# Patient Record
Sex: MALE | Race: WHITE | NOT HISPANIC OR LATINO | Employment: OTHER | URBAN - METROPOLITAN AREA
[De-identification: names, ages, dates, MRNs, and addresses within clinical notes are randomized per-mention and may not be internally consistent; named-entity substitution may affect disease eponyms.]

---

## 2017-02-10 ENCOUNTER — OFFICE VISIT (OUTPATIENT)
Dept: MEDICAL GROUP | Facility: MEDICAL CENTER | Age: 57
End: 2017-02-10
Payer: COMMERCIAL

## 2017-02-10 VITALS
RESPIRATION RATE: 16 BRPM | WEIGHT: 152 LBS | DIASTOLIC BLOOD PRESSURE: 76 MMHG | BODY MASS INDEX: 21.76 KG/M2 | HEIGHT: 70 IN | HEART RATE: 88 BPM | SYSTOLIC BLOOD PRESSURE: 118 MMHG | OXYGEN SATURATION: 95 % | TEMPERATURE: 98.7 F

## 2017-02-10 DIAGNOSIS — Z00.00 WELLNESS EXAMINATION: ICD-10-CM

## 2017-02-10 DIAGNOSIS — E78.5 DYSLIPIDEMIA: ICD-10-CM

## 2017-02-10 DIAGNOSIS — Z80.0 FAMILY HISTORY OF COLON CANCER: ICD-10-CM

## 2017-02-10 DIAGNOSIS — R25.1 TREMOR: ICD-10-CM

## 2017-02-10 DIAGNOSIS — Z23 NEED FOR TDAP VACCINATION: ICD-10-CM

## 2017-02-10 DIAGNOSIS — R73.02 IGT (IMPAIRED GLUCOSE TOLERANCE): ICD-10-CM

## 2017-02-10 PROCEDURE — 99396 PREV VISIT EST AGE 40-64: CPT | Mod: 25 | Performed by: INTERNAL MEDICINE

## 2017-02-10 PROCEDURE — 90471 IMMUNIZATION ADMIN: CPT | Performed by: INTERNAL MEDICINE

## 2017-02-10 PROCEDURE — 90715 TDAP VACCINE 7 YRS/> IM: CPT | Performed by: INTERNAL MEDICINE

## 2017-02-10 ASSESSMENT — PATIENT HEALTH QUESTIONNAIRE - PHQ9: CLINICAL INTERPRETATION OF PHQ2 SCORE: 0

## 2017-02-10 NOTE — MR AVS SNAPSHOT
"        Pantera Parker   2/10/2017 11:20 AM   Office Visit   MRN: [id number]    Department:  [address] [place] [address]   Dept Phone:  [phone]    Description:  Male : 1960   Provider:  Brant Wheeler M.D.           Reason for Visit     Establish Care     Tremors Right hand for 6 months      Allergies as of 2/10/2017     Allergen Noted Reactions    Lipitor [Atorvastatin] 02/10/2017         You were diagnosed with     Wellness examination   [4756134]       Family history of colon cancer   [456271]       Dyslipidemia   [518874]       IGT (impaired glucose tolerance)   [886586]       Need for Tdap vaccination   [164711]       Tremor   [874002]         Vital Signs     Blood Pressure Pulse Temperature Respirations Height Weight    118/76 mmHg 88 37.1 °C (98.7 °F) 16 1.778 m (5' 10\") 68.947 kg (152 lb)    Body Mass Index Oxygen Saturation Smoking Status             21.81 kg/m2 95% Never Smoker          Basic Information     Date Of Birth Sex Race Ethnicity Preferred Language    1960 Male White Non- English      Problem List              ICD-10-CM Priority Class Noted - Resolved    Dyslipidemia E78.5   10/6/2015 - Present    Diverticulosis of large intestine K57.30   10/6/2015 - Present    IGT (impaired glucose tolerance) R73.02   2016 - Present    Vitamin D deficiency E55.9   2016 - Present    Family history of colon cancer Z80.0   2/10/2017 - Present      Health Maintenance        Date Due Completion Dates    IMM DTaP/Tdap/Td Vaccine (1 - Tdap) 1981 ---    IMM INFLUENZA (1) 2016 ---    COLONOSCOPY 2018 (Done)    Override on 2013: Done            Current Immunizations     Tdap Vaccine  Incomplete      Below and/or attached are the medications your provider expects you to take. Review all of your home medications and newly ordered medications with your provider and/or pharmacist. Follow medication instructions as directed by your provider and/or pharmacist. Please " keep your medication list with you and share with your provider. Update the information when medications are discontinued, doses are changed, or new medications (including over-the-counter products) are added; and carry medication information at all times in the event of emergency situations     Allergies:  LIPITOR - (reactions not documented)               Medications  Valid as of: February 10, 2017 - 12:14 PM    Generic Name Brand Name Tablet Size Instructions for use    .                 Medicines prescribed today were sent to:     [facility]Y #[id number] - [place], [place] - [address]    [address] [place] [address]    Phone: [phone] Fax: [phone]    Open 24 Hours?: No      Medication refill instructions:       If your prescription bottle indicates you have medication refills left, it is not necessary to call your provider’s office. Please contact your pharmacy and they will refill your medication.    If your prescription bottle indicates you do not have any refills left, you may request refills at any time through one of the following ways: The online [employer] system (except Urgent Care), by calling your provider’s office, or by asking your pharmacy to contact your provider’s office with a refill request. Medication refills are processed only during regular business hours and may not be available until the next business day. Your provider may request additional information or to have a follow-up visit with you prior to refilling your medication.   *Please Note: Medication refills are assigned a new Rx number when refilled electronically. Your pharmacy may indicate that no refills were authorized even though a new prescription for the same medication is available at the pharmacy. Please request the medicine by name with the pharmacy before contacting your provider for a refill.        Your To Do List     Future Labs/Procedures Complete By Expires    COMP METABOLIC PANEL  As directed 2/11/2018     HEMOGLOBIN A1C  As directed 2/11/2018    LIPID PROFILE  As directed 2/11/2018    THYROID CASCADE PROFILE  As directed 2/11/2018         MyChart Access Code: Activation code not generated  Current MyChart Status: Active

## 2017-02-10 NOTE — PROGRESS NOTES
"CC: Establishing care wellness examination.    HPI:   Pantera presents today with the following.    1. Wellness examination  Presents for wellness examination. He is current with all major screenings as and blood work. Some slight elevated blood sugars in the past but no true diabetes. He did have some cholesterol abnormalities but is been diligent about diet and exercise. He does have an occasional tremor but nothing progressive. He is coming due for tetanus shot and declines flu shot.      Patient Active Problem List    Diagnosis Date Noted   • Family history of colon cancer 02/10/2017   • Tremor 02/10/2017   • IGT (impaired glucose tolerance) 05/20/2016   • Vitamin D deficiency 05/20/2016   • Dyslipidemia 10/06/2015   • Diverticulosis of large intestine 10/06/2015       No current outpatient prescriptions on file.     No current facility-administered medications for this visit.         Allergies as of 02/10/2017 - Baldemar as Reviewed 02/10/2017   Allergen Reaction Noted   • Lipitor [atorvastatin]  02/10/2017        ROS: As per HPI.    /76 mmHg  Pulse 88  Temp(Src) 37.1 °C (98.7 °F)  Resp 16  Ht 1.778 m (5' 10\")  Wt 68.947 kg (152 lb)  BMI 21.81 kg/m2  SpO2 95%    Physical Exam:  Gen:         Alert and oriented, No apparent distress.  Neck:        No Lymphadenopathy or Bruits.  Lungs:     Clear to auscultation bilaterally  CV:          Regular rate and rhythm. No murmurs, rubs or gallops.               Ext:          No clubbing, cyanosis, edema.      Assessment and Plan.   56 y.o. male with the following issues.    1. Wellness examination  Discussed healthy lifestyle habits as well as screening regimens. We have ordered blood work and given tetanus shot.    - COMP METABOLIC PANEL; Future  - LIPID PROFILE; Future  - HEMOGLOBIN A1C; Future  - THYROID CASCADE PROFILE; Future  - Tdap =>8yo IM        "

## 2017-02-22 LAB
ALBUMIN SERPL-MCNC: 4.5 G/DL (ref 3.5–5.5)
ALBUMIN/GLOB SERPL: 1.8 {RATIO} (ref 1.1–2.5)
ALP SERPL-CCNC: 85 IU/L (ref 39–117)
ALT SERPL-CCNC: 19 IU/L (ref 0–44)
AST SERPL-CCNC: 12 IU/L (ref 0–40)
BILIRUB SERPL-MCNC: 0.5 MG/DL (ref 0–1.2)
BUN SERPL-MCNC: 20 MG/DL (ref 6–24)
BUN/CREAT SERPL: 19 (ref 9–20)
CALCIUM SERPL-MCNC: 9.5 MG/DL (ref 8.7–10.2)
CHLORIDE SERPL-SCNC: 102 MMOL/L (ref 96–106)
CHOLEST SERPL-MCNC: 201 MG/DL (ref 100–199)
CO2 SERPL-SCNC: 25 MMOL/L (ref 18–29)
COMMENT 011824: ABNORMAL
CREAT SERPL-MCNC: 1.04 MG/DL (ref 0.76–1.27)
GLOBULIN SER CALC-MCNC: 2.5 G/DL (ref 1.5–4.5)
GLUCOSE SERPL-MCNC: 103 MG/DL (ref 65–99)
HBA1C MFR BLD: 5.6 % (ref 4.8–5.6)
HDLC SERPL-MCNC: 49 MG/DL
LDLC SERPL CALC-MCNC: 122 MG/DL (ref 0–99)
POTASSIUM SERPL-SCNC: 4.2 MMOL/L (ref 3.5–5.2)
PROT SERPL-MCNC: 7 G/DL (ref 6–8.5)
SODIUM SERPL-SCNC: 142 MMOL/L (ref 134–144)
TRIGL SERPL-MCNC: 148 MG/DL (ref 0–149)
TSH SERPL DL<=0.005 MIU/L-ACNC: 1.07 UIU/ML (ref 0.45–4.5)
VLDLC SERPL CALC-MCNC: 30 MG/DL (ref 5–40)

## 2017-03-02 ENCOUNTER — TELEPHONE (OUTPATIENT)
Dept: MEDICAL GROUP | Facility: MEDICAL CENTER | Age: 57
End: 2017-03-02

## 2017-03-02 NOTE — TELEPHONE ENCOUNTER
1. Caller Name: Pantera Parker                                           Call Back Number: [phone] (home)         Patient approves a detailed voicemail message: N\A    Patient stated that he had labs done for metabolic panel , Lipid and A 1C on 2/21, patient stated that the codes would need to be changed to routine preventative in order for his insurance to cover the cost, patient had the labs done through [facility]? Thank you Please advise

## 2017-03-08 NOTE — TELEPHONE ENCOUNTER
Patient called back, patient stated that he spoke with his insurance and that the insurance stated that it was not the patients deductible, that a different code has to be submitted in order for the insurance to cover the cost of the patients labs? Thank you Please advise

## 2017-03-08 NOTE — TELEPHONE ENCOUNTER
VOICEMAIL  1. Caller Name: Loren                      Call Back Number: [phone]    2. Message: Left VM for the patient to call back to give message regarding labs.    3. Patient approves office to leave a detailed voicemail/MyChart message: N\A

## 2017-03-08 NOTE — TELEPHONE ENCOUNTER
I spoke to [facility] they are going to reprocess with the new code being first.

## 2017-03-08 NOTE — TELEPHONE ENCOUNTER
I called [facility] and spoke to Pascale in billing, she stated that the code that was submitted was the code that Dr. Wheeler gave to change the code which was r73.02, Pascale stated that the reason the labs were denied coverage was due to the patients deductible? Thank you Please advise

## 2017-03-13 ENCOUNTER — OFFICE VISIT (OUTPATIENT)
Dept: MEDICAL GROUP | Facility: MEDICAL CENTER | Age: 57
End: 2017-03-13
Payer: COMMERCIAL

## 2017-03-13 VITALS
OXYGEN SATURATION: 95 % | HEIGHT: 71 IN | WEIGHT: 150 LBS | BODY MASS INDEX: 21 KG/M2 | RESPIRATION RATE: 14 BRPM | DIASTOLIC BLOOD PRESSURE: 72 MMHG | SYSTOLIC BLOOD PRESSURE: 128 MMHG | HEART RATE: 87 BPM

## 2017-03-13 DIAGNOSIS — R25.1 TREMOR: ICD-10-CM

## 2017-03-13 PROCEDURE — 99214 OFFICE O/P EST MOD 30 MIN: CPT | Performed by: INTERNAL MEDICINE

## 2017-03-13 RX ORDER — PROPRANOLOL HYDROCHLORIDE 10 MG/1
10 TABLET ORAL
Qty: 30 TAB | Refills: 1 | Status: SHIPPED | OUTPATIENT
Start: 2017-03-13 | End: 2018-11-29

## 2017-03-13 NOTE — MR AVS SNAPSHOT
"        Pantera Parker   3/13/2017 7:40 AM   Office Visit   MRN: [id number]    Department:  [address] [place] [address]   Dept Phone:  [phone]    Description:  Male : 1960   Provider:  Brant Wheeler M.D.           Allergies as of 3/13/2017     Allergen Noted Reactions    Lipitor [Atorvastatin] 02/10/2017         You were diagnosed with     Tremor   [[id number]]         Vital Signs     Blood Pressure Pulse Respirations Height Weight Body Mass Index    128/72 mmHg 87 14 1.803 m (5' 11\") 68.04 kg (150 lb) 20.93 kg/m2    Oxygen Saturation Smoking Status                95% Never Smoker           Basic Information     Date Of Birth Sex Race Ethnicity Preferred Language    1960 Male White Non- English      Problem List              ICD-10-CM Priority Class Noted - Resolved    Dyslipidemia E78.5   10/6/2015 - Present    Diverticulosis of large intestine K57.30   10/6/2015 - Present    IGT (impaired glucose tolerance) R73.02   2016 - Present    Vitamin D deficiency E55.9   2016 - Present    Family history of colon cancer Z80.0   2/10/2017 - Present    Tremor R25.1   2/10/2017 - Present      Health Maintenance        Date Due Completion Dates    IMM INFLUENZA (1) 2016 ---    COLONOSCOPY 2018 (Done)    Override on 2013: Done    IMM DTaP/Tdap/Td Vaccine (2 - Td) 2/10/2027 2/10/2017            Current Immunizations     Tdap Vaccine 2/10/2017 12:17 PM      Below and/or attached are the medications your provider expects you to take. Review all of your home medications and newly ordered medications with your provider and/or pharmacist. Follow medication instructions as directed by your provider and/or pharmacist. Please keep your medication list with you and share with your provider. Update the information when medications are discontinued, doses are changed, or new medications (including over-the-counter products) are added; and carry medication information at all times in the event " of emergency situations     Allergies:  LIPITOR - (reactions not documented)               Medications  Valid as of: March 13, 2017 -  7:50 AM    Generic Name Brand Name Tablet Size Instructions for use    Propranolol HCl (Tab) INDERAL 10 MG Take 1 Tab by mouth 1 time daily as needed.        .                 Medicines prescribed today were sent to:     [facility]/PHARMACY #[id number] - [place], [place] - [address]    [address] [place] [address]    Phone: [phone] Fax: [phone]    Open 24 Hours?: No      Medication refill instructions:       If your prescription bottle indicates you have medication refills left, it is not necessary to call your provider’s office. Please contact your pharmacy and they will refill your medication.    If your prescription bottle indicates you do not have any refills left, you may request refills at any time through one of the following ways: The online [email or web address] system (except Urgent Care), by calling your provider’s office, or by asking your pharmacy to contact your provider’s office with a refill request. Medication refills are processed only during regular business hours and may not be available until the next business day. Your provider may request additional information or to have a follow-up visit with you prior to refilling your medication.   *Please Note: Medication refills are assigned a new Rx number when refilled electronically. Your pharmacy may indicate that no refills were authorized even though a new prescription for the same medication is available at the pharmacy. Please request the medicine by name with the pharmacy before contacting your provider for a refill.           [email or web address] Access Code: Activation code not generated  Current [email or web address] Status: Active

## 2017-03-13 NOTE — PROGRESS NOTES
"CC: Tremor    HPI:   Pantera presents today with the following.    1. Tremor  Presents complaining of tremor mainly affecting his right arm. He only notices it at rest and disappears immediately with intention. Alcohol may improve slightly. He does notice what sounds like more like fasciculations in his gluteus reginaldo occasionally. Does not seem to related to the arm tremor. No loss of expression or unstable gait. He denies any stiffness. He has no chest pain or shortness of breath no edema.      Patient Active Problem List    Diagnosis Date Noted   • Family history of colon cancer 02/10/2017   • Tremor 02/10/2017   • IGT (impaired glucose tolerance) 05/20/2016   • Vitamin D deficiency 05/20/2016   • Dyslipidemia 10/06/2015   • Diverticulosis of large intestine 10/06/2015       Current Outpatient Prescriptions   Medication Sig Dispense Refill   • propranolol (INDERAL) 10 MG Tab Take 1 Tab by mouth 1 time daily as needed. 30 Tab 1     No current facility-administered medications for this visit.         Allergies as of 03/13/2017 - Baldemar as Reviewed 02/10/2017   Allergen Reaction Noted   • Lipitor [atorvastatin]  02/10/2017        ROS: As per HPI.    /72 mmHg  Pulse 87  Resp 14  Ht 1.803 m (5' 11\")  Wt 68.04 kg (150 lb)  BMI 20.93 kg/m2  SpO2 95%    Physical Exam:  Gen:         Alert and oriented, No apparent distress.  Neck:        No Lymphadenopathy or Bruits.  Lungs:     Clear to auscultation bilaterally  CV:          Regular rate and rhythm. No murmurs, rubs or gallops.               Ext:          No clubbing, cyanosis, edema.  Neuro:  CN II-XII gorssly intact, Strenght 5/5 upper lower extremities, DTR's 2+ both               Upper and lower extremities.  Down going Babinski, Neg Romberg. Fine resting tremor non-pill-rolling of the right arm disappears with movement  Assessment and Plan.   56 y.o. male with the following issues.    1. Tremor  Suspect benign essential tremor have started on propranolol " short acting to see if he has any improvement with symptoms. Cautioned about side effects. They're not severe enough that he wanted taken medication daily. If medication is not helpful with the tremor will refer to neurology.  - propranolol (INDERAL) 10 MG Tab; Take 1 Tab by mouth 1 time daily as needed.  Dispense: 30 Tab; Refill: 1

## 2017-04-03 ENCOUNTER — TELEPHONE (OUTPATIENT)
Dept: MEDICAL GROUP | Facility: MEDICAL CENTER | Age: 57
End: 2017-04-03

## 2017-04-03 DIAGNOSIS — R25.1 TREMOR: ICD-10-CM

## 2017-04-03 NOTE — TELEPHONE ENCOUNTER
1. Caller Name: Pantera                      Call Back Number: [phone] (home)       2. Message: Patient needs his Neurologist referral to be resubmitted for [facility] [id number]   They can get him in sooner.    3. Patient approves office to leave a detailed voicemail/MyChart message: N\A

## 2017-06-23 ENCOUNTER — TELEPHONE (OUTPATIENT)
Dept: MEDICAL GROUP | Facility: MEDICAL CENTER | Age: 57
End: 2017-06-23

## 2017-06-23 NOTE — TELEPHONE ENCOUNTER
Please inform it was not coded incorrectly it was coded in a way that his insurance does not want to pay.  Which test was it that he had to pay for?

## 2017-06-23 NOTE — TELEPHONE ENCOUNTER
Spoke with [facility] and added the code. They resubmitted to the insurance. Spoke with patient and let him know.

## 2017-06-23 NOTE — TELEPHONE ENCOUNTER
1. Caller Name: Pantera Parker                                           Call Back Number: [phone] (home)         Patient approves a detailed voicemail message: N\A    Patient stated that he had labs drawn for metabolic panel, lipid panel and A1C on 2/21 through [facility] at [address] and cost for the labs are 23.50  due to incorrect coding patient stated that the labs were paid out of the patients HRA and needed to be paid out of [facility], patient said it came out of the wrong account, Pantera said that the code needs to be changed to the code for Preventative Care so that [facility] covers the cost of the labs, patient wanted someone to call him back regarding this? Please advise

## 2017-06-23 NOTE — TELEPHONE ENCOUNTER
[facility]ed [facility] and she tried adding the code to the blood test but it is not being accepted because it conflicts with the payers requirements. Her system does not say what the requirements are. Please advise.

## 2018-01-09 ENCOUNTER — OFFICE VISIT (OUTPATIENT)
Dept: MEDICAL GROUP | Facility: MEDICAL CENTER | Age: 58
End: 2018-01-09
Payer: COMMERCIAL

## 2018-01-09 VITALS
HEIGHT: 71 IN | BODY MASS INDEX: 21.59 KG/M2 | HEART RATE: 77 BPM | OXYGEN SATURATION: 97 % | DIASTOLIC BLOOD PRESSURE: 78 MMHG | WEIGHT: 154.2 LBS | SYSTOLIC BLOOD PRESSURE: 134 MMHG | RESPIRATION RATE: 16 BRPM | TEMPERATURE: 97.7 F

## 2018-01-09 DIAGNOSIS — E78.5 DYSLIPIDEMIA: ICD-10-CM

## 2018-01-09 DIAGNOSIS — Z11.59 NEED FOR HEPATITIS C SCREENING TEST: ICD-10-CM

## 2018-01-09 DIAGNOSIS — R73.02 IGT (IMPAIRED GLUCOSE TOLERANCE): ICD-10-CM

## 2018-01-09 DIAGNOSIS — Z00.00 WELLNESS EXAMINATION: ICD-10-CM

## 2018-01-09 DIAGNOSIS — Z12.11 SCREEN FOR COLON CANCER: ICD-10-CM

## 2018-01-09 PROCEDURE — 99396 PREV VISIT EST AGE 40-64: CPT | Performed by: INTERNAL MEDICINE

## 2018-01-09 RX ORDER — RASAGILINE 1 MG/1
1 TABLET ORAL DAILY
COMMUNITY
End: 2018-11-29

## 2018-01-09 NOTE — PROGRESS NOTES
"CC: Wellness examination    HPI:   Pantera presents today with the following.    1. Wellness examination  Presents due for wellness examination. He is coming due for colonoscopy. He refuses flu shots. He denies any chest pain or shortness of breath no edema is coming due for repeat blood work. He has no other complaints today.          Patient Active Problem List    Diagnosis Date Noted   • Family history of colon cancer 02/10/2017   • Tremor 02/10/2017   • IGT (impaired glucose tolerance) 05/20/2016   • Vitamin D deficiency 05/20/2016   • Dyslipidemia 10/06/2015   • Diverticulosis of large intestine 10/06/2015       Current Outpatient Prescriptions   Medication Sig Dispense Refill   • rasagiline (AZILECT) 1 MG Tab Take 1 mg by mouth every day.     • propranolol (INDERAL) 10 MG Tab Take 1 Tab by mouth 1 time daily as needed. (Patient not taking: Reported on 1/9/2018) 30 Tab 1     No current facility-administered medications for this visit.          Allergies as of 01/09/2018 - Reviewed 01/09/2018   Allergen Reaction Noted   • Lipitor [atorvastatin]  02/10/2017        ROS: As per HPI.    /78   Pulse 77   Temp 36.5 °C (97.7 °F)   Resp 16   Ht 1.803 m (5' 11\")   Wt 69.9 kg (154 lb 3.2 oz)   SpO2 97%   BMI 21.51 kg/m²     Physical Exam:  Gen:         Alert and oriented, No apparent distress.  Neck:        No Lymphadenopathy or Bruits.  Lungs:     Clear to auscultation bilaterally  CV:          Regular rate and rhythm. No murmurs, rubs or gallops.               Ext:          No clubbing, cyanosis, edema.      Assessment and Plan.   57 y.o. male with the following issues.    1. Wellness examination  Discussed healthy lifestyle habits as well as screening regimens.  - COMP METABOLIC PANEL; Future  - LIPID PROFILE; Future  - HEMOGLOBIN A1C; Future  - REFERRAL TO GASTROENTEROLOGY  - HEP C VIRUS ANTIBODY; Future          "

## 2018-02-22 LAB
ALBUMIN SERPL-MCNC: 4.8 G/DL (ref 3.5–5.5)
ALBUMIN/GLOB SERPL: 2.3 {RATIO} (ref 1.2–2.2)
ALP SERPL-CCNC: 75 IU/L (ref 39–117)
ALT SERPL-CCNC: 15 IU/L (ref 0–44)
AST SERPL-CCNC: 13 IU/L (ref 0–40)
BILIRUB SERPL-MCNC: 0.8 MG/DL (ref 0–1.2)
BUN SERPL-MCNC: 18 MG/DL (ref 6–24)
BUN/CREAT SERPL: 18 (ref 9–20)
CALCIUM SERPL-MCNC: 9.5 MG/DL (ref 8.7–10.2)
CHLORIDE SERPL-SCNC: 104 MMOL/L (ref 96–106)
CHOLEST SERPL-MCNC: 187 MG/DL (ref 100–199)
CO2 SERPL-SCNC: 25 MMOL/L (ref 18–29)
COMMENT 144067: NORMAL
CREAT SERPL-MCNC: 1.02 MG/DL (ref 0.76–1.27)
GFR SERPLBLD CREATININE-BSD FMLA CKD-EPI: 82 ML/MIN/{1.73_M2}
GFR SERPLBLD CREATININE-BSD FMLA CKD-EPI: 95 ML/MIN/{1.73_M2}
GLOBULIN SER CALC-MCNC: 2.1 G/L (ref 1.5–4.5)
GLUCOSE SERPL-MCNC: 94 MG/DL (ref 65–99)
HBA1C MFR BLD: 5.4 % (ref 4.8–5.6)
HCV AB S/CO SERPL IA: <0.1 S/CO RATIO (ref 0–0.9)
HDLC SERPL-MCNC: 50 MG/DL
LABORATORY COMMENT REPORT: ABNORMAL
LDLC SERPL CALC-MCNC: 117 MG/DL (ref 0–99)
POTASSIUM SERPL-SCNC: 4.3 MMOL/L (ref 3.5–5.2)
PROT SERPL-MCNC: 6.9 G/DL (ref 6–8.5)
SODIUM SERPL-SCNC: 142 MMOL/L (ref 134–144)
TRIGL SERPL-MCNC: 99 MG/DL (ref 0–149)
VLDLC SERPL CALC-MCNC: 20 MG/DL (ref 5–40)

## 2018-10-25 ENCOUNTER — OFFICE VISIT (OUTPATIENT)
Dept: MEDICAL GROUP | Facility: MEDICAL CENTER | Age: 58
End: 2018-10-25
Payer: COMMERCIAL

## 2018-10-25 VITALS
DIASTOLIC BLOOD PRESSURE: 64 MMHG | BODY MASS INDEX: 21.88 KG/M2 | SYSTOLIC BLOOD PRESSURE: 128 MMHG | WEIGHT: 156.31 LBS | OXYGEN SATURATION: 97 % | HEART RATE: 74 BPM | HEIGHT: 71 IN | TEMPERATURE: 96 F

## 2018-10-25 DIAGNOSIS — Z00.00 WELLNESS EXAMINATION: ICD-10-CM

## 2018-10-25 DIAGNOSIS — R10.33 UMBILICAL PAIN: ICD-10-CM

## 2018-10-25 DIAGNOSIS — Z12.5 SCREENING PSA (PROSTATE SPECIFIC ANTIGEN): ICD-10-CM

## 2018-10-25 PROCEDURE — 99213 OFFICE O/P EST LOW 20 MIN: CPT | Performed by: INTERNAL MEDICINE

## 2018-10-25 ASSESSMENT — PATIENT HEALTH QUESTIONNAIRE - PHQ9: CLINICAL INTERPRETATION OF PHQ2 SCORE: 0

## 2018-10-25 NOTE — PROGRESS NOTES
"CC: Umbilical pain.    HPI:   Pantera presents today with the following.    1. Umbilical pain  Presents experiencing pain around his bellybutton for the last 2 days.  Began 3 out of 10 in intensity while laying on his stomach.  He reports it is 50% improved today.  Did not notice any lumps or bumps has no fevers or chills no other associated symptoms no nausea vomiting changes to bowels.          Patient Active Problem List    Diagnosis Date Noted   • Family history of colon cancer 02/10/2017   • Tremor 02/10/2017   • IGT (impaired glucose tolerance) 05/20/2016   • Vitamin D deficiency 05/20/2016   • Dyslipidemia 10/06/2015   • Diverticulosis of large intestine 10/06/2015       Current Outpatient Prescriptions   Medication Sig Dispense Refill   • rasagiline (AZILECT) 1 MG Tab Take 1 mg by mouth every day.     • propranolol (INDERAL) 10 MG Tab Take 1 Tab by mouth 1 time daily as needed. (Patient not taking: Reported on 1/9/2018) 30 Tab 1     No current facility-administered medications for this visit.          Allergies as of 10/25/2018 - Reviewed 10/25/2018   Allergen Reaction Noted   • Lipitor [atorvastatin]  02/10/2017        ROS: Denies Chest pain, SOB, LE edema.    /64 (BP Location: Left arm, Patient Position: Sitting, BP Cuff Size: Adult)   Pulse 74   Temp (!) 35.6 °C (96 °F) (Temporal)   Ht 1.803 m (5' 11\")   Wt 70.9 kg (156 lb 4.9 oz)   SpO2 97%   BMI 21.80 kg/m²     Physical Exam:  Gen:         Alert and oriented, No apparent distress.  Neck:        No Lymphadenopathy or Bruits.  Lungs:     Clear to auscultation bilaterally  CV:          Regular rate and rhythm. No murmurs, rubs or gallops.          ABD:      Soft mild tender at the umbilicus no rebound or guarding.  R, non distended. Normal active bowel sounds.  No  Hepatosplenomegaly, No pulsatile masses.  No obvious hernia.         Ext:          No clubbing, cyanosis, edema.      Assessment and Plan.   58 y.o. male with the following " issues.    1. Umbilical pain  Suspicion is unappreciated umbilical hernia.  The fact that it is getting improved may have already reduced itself.  Have written for ultrasound of pain should persist.  Have given ER precautions if he develops severe pain.  - US-ABDOMEN COMPLETE SURVEY; Future

## 2018-11-29 ENCOUNTER — OFFICE VISIT (OUTPATIENT)
Dept: URGENT CARE | Facility: CLINIC | Age: 58
End: 2018-11-29
Payer: COMMERCIAL

## 2018-11-29 ENCOUNTER — APPOINTMENT (OUTPATIENT)
Dept: RADIOLOGY | Facility: MEDICAL CENTER | Age: 58
End: 2018-11-29
Attending: EMERGENCY MEDICINE
Payer: COMMERCIAL

## 2018-11-29 ENCOUNTER — HOSPITAL ENCOUNTER (OUTPATIENT)
Facility: MEDICAL CENTER | Age: 58
End: 2018-12-01
Attending: EMERGENCY MEDICINE | Admitting: INTERNAL MEDICINE
Payer: COMMERCIAL

## 2018-11-29 VITALS
HEIGHT: 70 IN | BODY MASS INDEX: 22.19 KG/M2 | OXYGEN SATURATION: 97 % | HEART RATE: 87 BPM | SYSTOLIC BLOOD PRESSURE: 142 MMHG | TEMPERATURE: 98.7 F | DIASTOLIC BLOOD PRESSURE: 92 MMHG | WEIGHT: 155 LBS | RESPIRATION RATE: 18 BRPM

## 2018-11-29 DIAGNOSIS — R06.09 DOE (DYSPNEA ON EXERTION): ICD-10-CM

## 2018-11-29 DIAGNOSIS — R07.9 CHEST PAIN WITH MODERATE RISK FOR CARDIAC ETIOLOGY: ICD-10-CM

## 2018-11-29 DIAGNOSIS — R07.9 CHEST PAIN, UNSPECIFIED TYPE: ICD-10-CM

## 2018-11-29 PROBLEM — R73.9 HYPERGLYCEMIA: Status: ACTIVE | Noted: 2018-11-29

## 2018-11-29 LAB
ALBUMIN SERPL BCP-MCNC: 4.5 G/DL (ref 3.2–4.9)
ALBUMIN/GLOB SERPL: 1.7 G/DL
ALP SERPL-CCNC: 80 U/L (ref 30–99)
ALT SERPL-CCNC: 22 U/L (ref 2–50)
ANION GAP SERPL CALC-SCNC: 12 MMOL/L (ref 0–11.9)
APTT PPP: 30.8 SEC (ref 24.7–36)
AST SERPL-CCNC: 16 U/L (ref 12–45)
BASOPHILS # BLD AUTO: 0.4 % (ref 0–1.8)
BASOPHILS # BLD: 0.03 K/UL (ref 0–0.12)
BILIRUB SERPL-MCNC: 0.7 MG/DL (ref 0.1–1.5)
BNP SERPL-MCNC: 7 PG/ML (ref 0–100)
BUN SERPL-MCNC: 15 MG/DL (ref 8–22)
CALCIUM SERPL-MCNC: 9.4 MG/DL (ref 8.5–10.5)
CHLORIDE SERPL-SCNC: 104 MMOL/L (ref 96–112)
CO2 SERPL-SCNC: 23 MMOL/L (ref 20–33)
CREAT SERPL-MCNC: 1 MG/DL (ref 0.5–1.4)
EKG IMPRESSION: NORMAL
EKG IMPRESSION: NORMAL
EOSINOPHIL # BLD AUTO: 0.12 K/UL (ref 0–0.51)
EOSINOPHIL NFR BLD: 1.7 % (ref 0–6.9)
ERYTHROCYTE [DISTWIDTH] IN BLOOD BY AUTOMATED COUNT: 40.7 FL (ref 35.9–50)
GLOBULIN SER CALC-MCNC: 2.6 G/DL (ref 1.9–3.5)
GLUCOSE SERPL-MCNC: 151 MG/DL (ref 65–99)
HCT VFR BLD AUTO: 49.2 % (ref 42–52)
HGB BLD-MCNC: 17 G/DL (ref 14–18)
IMM GRANULOCYTES # BLD AUTO: 0.02 K/UL (ref 0–0.11)
IMM GRANULOCYTES NFR BLD AUTO: 0.3 % (ref 0–0.9)
INR PPP: 1 (ref 0.87–1.13)
LIPASE SERPL-CCNC: 34 U/L (ref 11–82)
LYMPHOCYTES # BLD AUTO: 2 K/UL (ref 1–4.8)
LYMPHOCYTES NFR BLD: 28.3 % (ref 22–41)
MCH RBC QN AUTO: 31.2 PG (ref 27–33)
MCHC RBC AUTO-ENTMCNC: 34.6 G/DL (ref 33.7–35.3)
MCV RBC AUTO: 90.3 FL (ref 81.4–97.8)
MONOCYTES # BLD AUTO: 0.55 K/UL (ref 0–0.85)
MONOCYTES NFR BLD AUTO: 7.8 % (ref 0–13.4)
NEUTROPHILS # BLD AUTO: 4.34 K/UL (ref 1.82–7.42)
NEUTROPHILS NFR BLD: 61.5 % (ref 44–72)
NRBC # BLD AUTO: 0 K/UL
NRBC BLD-RTO: 0 /100 WBC
PLATELET # BLD AUTO: 282 K/UL (ref 164–446)
PMV BLD AUTO: 9.5 FL (ref 9–12.9)
POTASSIUM SERPL-SCNC: 3.8 MMOL/L (ref 3.6–5.5)
PROT SERPL-MCNC: 7.1 G/DL (ref 6–8.2)
PROTHROMBIN TIME: 13.3 SEC (ref 12–14.6)
RBC # BLD AUTO: 5.45 M/UL (ref 4.7–6.1)
SODIUM SERPL-SCNC: 139 MMOL/L (ref 135–145)
TROPONIN I SERPL-MCNC: <0.01 NG/ML (ref 0–0.04)
TROPONIN I SERPL-MCNC: <0.01 NG/ML (ref 0–0.04)
WBC # BLD AUTO: 7.1 K/UL (ref 4.8–10.8)

## 2018-11-29 PROCEDURE — 83690 ASSAY OF LIPASE: CPT

## 2018-11-29 PROCEDURE — 99285 EMERGENCY DEPT VISIT HI MDM: CPT

## 2018-11-29 PROCEDURE — 93005 ELECTROCARDIOGRAM TRACING: CPT | Performed by: EMERGENCY MEDICINE

## 2018-11-29 PROCEDURE — 71045 X-RAY EXAM CHEST 1 VIEW: CPT

## 2018-11-29 PROCEDURE — 99214 OFFICE O/P EST MOD 30 MIN: CPT | Performed by: FAMILY MEDICINE

## 2018-11-29 PROCEDURE — 83880 ASSAY OF NATRIURETIC PEPTIDE: CPT

## 2018-11-29 PROCEDURE — 85610 PROTHROMBIN TIME: CPT

## 2018-11-29 PROCEDURE — 84484 ASSAY OF TROPONIN QUANT: CPT

## 2018-11-29 PROCEDURE — 80053 COMPREHEN METABOLIC PANEL: CPT

## 2018-11-29 PROCEDURE — 93005 ELECTROCARDIOGRAM TRACING: CPT | Performed by: INTERNAL MEDICINE

## 2018-11-29 PROCEDURE — G0378 HOSPITAL OBSERVATION PER HR: HCPCS

## 2018-11-29 PROCEDURE — 700105 HCHG RX REV CODE 258: Performed by: INTERNAL MEDICINE

## 2018-11-29 PROCEDURE — 93005 ELECTROCARDIOGRAM TRACING: CPT

## 2018-11-29 PROCEDURE — 85730 THROMBOPLASTIN TIME PARTIAL: CPT

## 2018-11-29 PROCEDURE — 36415 COLL VENOUS BLD VENIPUNCTURE: CPT

## 2018-11-29 PROCEDURE — 99205 OFFICE O/P NEW HI 60 MIN: CPT | Performed by: INTERNAL MEDICINE

## 2018-11-29 PROCEDURE — 99220 PR INITIAL OBSERVATION CARE,LEVL III: CPT | Performed by: INTERNAL MEDICINE

## 2018-11-29 PROCEDURE — 85025 COMPLETE CBC W/AUTO DIFF WBC: CPT

## 2018-11-29 RX ORDER — SODIUM CHLORIDE 9 MG/ML
INJECTION, SOLUTION INTRAVENOUS CONTINUOUS
Status: DISCONTINUED | OUTPATIENT
Start: 2018-11-29 | End: 2018-11-30 | Stop reason: CLARIF

## 2018-11-29 RX ORDER — ACETAMINOPHEN 325 MG/1
650 TABLET ORAL EVERY 6 HOURS PRN
Status: DISCONTINUED | OUTPATIENT
Start: 2018-11-29 | End: 2018-12-01 | Stop reason: HOSPADM

## 2018-11-29 RX ORDER — CHLORAL HYDRATE 500 MG
1000 CAPSULE ORAL DAILY
COMMUNITY
End: 2018-12-13

## 2018-11-29 RX ORDER — ASPIRIN 300 MG/1
300 SUPPOSITORY RECTAL EVERY EVENING
Status: DISCONTINUED | OUTPATIENT
Start: 2018-11-29 | End: 2018-11-30 | Stop reason: CLARIF

## 2018-11-29 RX ORDER — ONDANSETRON 2 MG/ML
4 INJECTION INTRAMUSCULAR; INTRAVENOUS EVERY 4 HOURS PRN
Status: DISCONTINUED | OUTPATIENT
Start: 2018-11-29 | End: 2018-12-01 | Stop reason: HOSPADM

## 2018-11-29 RX ORDER — SODIUM CHLORIDE 9 MG/ML
INJECTION, SOLUTION INTRAVENOUS CONTINUOUS
Status: DISCONTINUED | OUTPATIENT
Start: 2018-11-30 | End: 2018-11-29

## 2018-11-29 RX ORDER — ENALAPRILAT 1.25 MG/ML
1.25 INJECTION INTRAVENOUS EVERY 6 HOURS PRN
Status: DISCONTINUED | OUTPATIENT
Start: 2018-11-29 | End: 2018-12-01 | Stop reason: HOSPADM

## 2018-11-29 RX ORDER — ONDANSETRON 4 MG/1
4 TABLET, ORALLY DISINTEGRATING ORAL EVERY 4 HOURS PRN
Status: DISCONTINUED | OUTPATIENT
Start: 2018-11-29 | End: 2018-12-01 | Stop reason: HOSPADM

## 2018-11-29 RX ORDER — ASPIRIN 325 MG
325 TABLET ORAL EVERY EVENING
Status: DISCONTINUED | OUTPATIENT
Start: 2018-11-29 | End: 2018-11-30

## 2018-11-29 RX ORDER — NITROGLYCERIN 0.4 MG/1
0.4 TABLET SUBLINGUAL
Status: DISCONTINUED | OUTPATIENT
Start: 2018-11-29 | End: 2018-12-01 | Stop reason: HOSPADM

## 2018-11-29 RX ORDER — MORPHINE SULFATE 10 MG/ML
2-4 INJECTION, SOLUTION INTRAMUSCULAR; INTRAVENOUS
Status: DISCONTINUED | OUTPATIENT
Start: 2018-11-29 | End: 2018-12-01 | Stop reason: HOSPADM

## 2018-11-29 RX ORDER — ASPIRIN 81 MG/1
324 TABLET, CHEWABLE ORAL EVERY EVENING
Status: DISCONTINUED | OUTPATIENT
Start: 2018-11-29 | End: 2018-11-30 | Stop reason: CLARIF

## 2018-11-29 RX ORDER — ASPIRIN 81 MG/1
324 TABLET, CHEWABLE ORAL ONCE
Status: DISCONTINUED | OUTPATIENT
Start: 2018-11-29 | End: 2018-11-29

## 2018-11-29 RX ADMIN — ASPIRIN 324 MG: 81 TABLET, CHEWABLE ORAL at 18:53

## 2018-11-29 RX ADMIN — SODIUM CHLORIDE: 9 INJECTION, SOLUTION INTRAVENOUS at 20:18

## 2018-11-29 ASSESSMENT — ENCOUNTER SYMPTOMS
SINUS PAIN: 0
TREMORS: 1
FATIGUE: 1
BLOOD IN STOOL: 0
HEMOPTYSIS: 0
ACTIVITY CHANGE: 1
SPEECH DIFFICULTY: 0
NERVOUS/ANXIOUS: 0
DOUBLE VISION: 0
PALPITATIONS: 0
MYALGIAS: 0
APNEA: 0
DEPRESSION: 0
HEADACHES: 0
HEARTBURN: 0
FLANK PAIN: 0
AGITATION: 0
HALLUCINATIONS: 0
EYE ITCHING: 0
WEIGHT LOSS: 0
PND: 0
SORE THROAT: 0
BLURRED VISION: 0
CHILLS: 0
ABDOMINAL PAIN: 0
MEMORY LOSS: 0
MUSCULOSKELETAL NEGATIVE: 1
UNEXPECTED WEIGHT CHANGE: 0
PND: 0
CLAUDICATION: 0
CHOKING: 0
NECK PAIN: 0
VOMITING: 0
INSOMNIA: 0
FALLS: 0
ABDOMINAL DISTENTION: 0
FACIAL ASYMMETRY: 0
DIZZINESS: 0
PALPITATIONS: 0
COUGH: 0
NEUROLOGICAL NEGATIVE: 1
CONSTIPATION: 0
DIAPHORESIS: 0
WHEEZING: 0
DIARRHEA: 0
SHORTNESS OF BREATH: 1
BRUISES/BLEEDS EASILY: 0
APPETITE CHANGE: 0
BACK PAIN: 0
ARTHRALGIAS: 0
FEVER: 0
EYE DISCHARGE: 0
SHORTNESS OF BREATH: 1
WEAKNESS: 0
STRIDOR: 0
CONSTITUTIONAL NEGATIVE: 1
NAUSEA: 0
ORTHOPNEA: 0
EYES NEGATIVE: 1
ADENOPATHY: 0

## 2018-11-29 ASSESSMENT — LIFESTYLE VARIABLES
DO YOU DRINK ALCOHOL: NO
SUBSTANCE_ABUSE: 0
ALCOHOL_USE: NO
EVER_SMOKED: NEVER

## 2018-11-29 ASSESSMENT — COPD QUESTIONNAIRES
HAVE YOU SMOKED AT LEAST 100 CIGARETTES IN YOUR ENTIRE LIFE: NO/DON'T KNOW
COPD SCREENING SCORE: 1
DO YOU EVER COUGH UP ANY MUCUS OR PHLEGM?: NO/ONLY WITH OCCASIONAL COLDS OR INFECTIONS
DURING THE PAST 4 WEEKS HOW MUCH DID YOU FEEL SHORT OF BREATH: NONE/LITTLE OF THE TIME
COPD: 0

## 2018-11-29 ASSESSMENT — PATIENT HEALTH QUESTIONNAIRE - PHQ9
SUM OF ALL RESPONSES TO PHQ9 QUESTIONS 1 AND 2: 0
2. FEELING DOWN, DEPRESSED, IRRITABLE, OR HOPELESS: NOT AT ALL
1. LITTLE INTEREST OR PLEASURE IN DOING THINGS: NOT AT ALL

## 2018-11-29 ASSESSMENT — PAIN SCALES - GENERAL
PAINLEVEL_OUTOF10: 0
PAINLEVEL_OUTOF10: 4

## 2018-11-29 NOTE — ED TRIAGE NOTES
Chief Complaint   Patient presents with   • Chest Pain     for the past 2 days/ pt explains it feels like breathing in cold air that comes and goes for about 30-45 sec/ pt describes pain to be mid sternal and non radiating     Explained to pt triage process, made pt aware to tell this RN/staff of any changes/concerns, pt verbalized understanding of process and instructions given. Pt to ER jackelin.

## 2018-11-29 NOTE — PATIENT INSTRUCTIONS
Please go to [facility] emergency department for further evaluation

## 2018-11-30 ENCOUNTER — APPOINTMENT (OUTPATIENT)
Dept: CARDIOLOGY | Facility: MEDICAL CENTER | Age: 58
End: 2018-11-30
Attending: INTERNAL MEDICINE
Payer: COMMERCIAL

## 2018-11-30 ENCOUNTER — PATIENT OUTREACH (OUTPATIENT)
Dept: HEALTH INFORMATION MANAGEMENT | Facility: OTHER | Age: 58
End: 2018-11-30

## 2018-11-30 ENCOUNTER — APPOINTMENT (OUTPATIENT)
Dept: RADIOLOGY | Facility: MEDICAL CENTER | Age: 58
End: 2018-11-30
Attending: INTERNAL MEDICINE
Payer: COMMERCIAL

## 2018-11-30 PROBLEM — I10 HYPERTENSION: Status: ACTIVE | Noted: 2018-11-30

## 2018-11-30 LAB
ALBUMIN SERPL BCP-MCNC: 3.8 G/DL (ref 3.2–4.9)
ALBUMIN/GLOB SERPL: 1.8 G/DL
ALP SERPL-CCNC: 68 U/L (ref 30–99)
ALT SERPL-CCNC: 15 U/L (ref 2–50)
ANION GAP SERPL CALC-SCNC: 9 MMOL/L (ref 0–11.9)
APTT PPP: 30.2 SEC (ref 24.7–36)
AST SERPL-CCNC: 16 U/L (ref 12–45)
BILIRUB SERPL-MCNC: 0.7 MG/DL (ref 0.1–1.5)
BUN SERPL-MCNC: 15 MG/DL (ref 8–22)
CALCIUM SERPL-MCNC: 8.5 MG/DL (ref 8.5–10.5)
CHLORIDE SERPL-SCNC: 110 MMOL/L (ref 96–112)
CHOLEST SERPL-MCNC: 182 MG/DL (ref 100–199)
CO2 SERPL-SCNC: 22 MMOL/L (ref 20–33)
CREAT SERPL-MCNC: 0.92 MG/DL (ref 0.5–1.4)
EKG IMPRESSION: NORMAL
EKG IMPRESSION: NORMAL
ERYTHROCYTE [DISTWIDTH] IN BLOOD BY AUTOMATED COUNT: 40.5 FL (ref 35.9–50)
EST. AVERAGE GLUCOSE BLD GHB EST-MCNC: 123 MG/DL
GLOBULIN SER CALC-MCNC: 2.1 G/DL (ref 1.9–3.5)
GLUCOSE SERPL-MCNC: 111 MG/DL (ref 65–99)
HBA1C MFR BLD: 5.9 % (ref 0–5.6)
HCT VFR BLD AUTO: 45.6 % (ref 42–52)
HDLC SERPL-MCNC: 36 MG/DL
HGB BLD-MCNC: 15.7 G/DL (ref 14–18)
INR PPP: 1.03 (ref 0.87–1.13)
LDLC SERPL CALC-MCNC: 114 MG/DL
LV EJECT FRACT  99904: 55
LV EJECT FRACT MOD 2C 99903: 45.29
LV EJECT FRACT MOD 4C 99902: 48.72
LV EJECT FRACT MOD BP 99901: 47.51
MCH RBC QN AUTO: 31.3 PG (ref 27–33)
MCHC RBC AUTO-ENTMCNC: 34.4 G/DL (ref 33.7–35.3)
MCV RBC AUTO: 91 FL (ref 81.4–97.8)
PLATELET # BLD AUTO: 246 K/UL (ref 164–446)
PMV BLD AUTO: 9.5 FL (ref 9–12.9)
POTASSIUM SERPL-SCNC: 3.6 MMOL/L (ref 3.6–5.5)
PROT SERPL-MCNC: 5.9 G/DL (ref 6–8.2)
PROTHROMBIN TIME: 13.6 SEC (ref 12–14.6)
RBC # BLD AUTO: 5.01 M/UL (ref 4.7–6.1)
SODIUM SERPL-SCNC: 141 MMOL/L (ref 135–145)
TRIGL SERPL-MCNC: 158 MG/DL (ref 0–149)
TSH SERPL DL<=0.005 MIU/L-ACNC: 1.22 UIU/ML (ref 0.38–5.33)
WBC # BLD AUTO: 7.2 K/UL (ref 4.8–10.8)

## 2018-11-30 PROCEDURE — 93458 L HRT ARTERY/VENTRICLE ANGIO: CPT | Mod: 26,59 | Performed by: INTERNAL MEDICINE

## 2018-11-30 PROCEDURE — C9600 PERC DRUG-EL COR STENT SING: HCPCS | Mod: LD

## 2018-11-30 PROCEDURE — G0378 HOSPITAL OBSERVATION PER HR: HCPCS

## 2018-11-30 PROCEDURE — 93010 ELECTROCARDIOGRAM REPORT: CPT | Performed by: INTERNAL MEDICINE

## 2018-11-30 PROCEDURE — C1894 INTRO/SHEATH, NON-LASER: HCPCS

## 2018-11-30 PROCEDURE — 93567 NJX CAR CTH SPRVLV AORTGRPHY: CPT | Performed by: INTERNAL MEDICINE

## 2018-11-30 PROCEDURE — C1874 STENT, COATED/COV W/DEL SYS: HCPCS

## 2018-11-30 PROCEDURE — 700102 HCHG RX REV CODE 250 W/ 637 OVERRIDE(OP)

## 2018-11-30 PROCEDURE — 99152 MOD SED SAME PHYS/QHP 5/>YRS: CPT

## 2018-11-30 PROCEDURE — 85027 COMPLETE CBC AUTOMATED: CPT

## 2018-11-30 PROCEDURE — 93005 ELECTROCARDIOGRAM TRACING: CPT | Performed by: INTERNAL MEDICINE

## 2018-11-30 PROCEDURE — A9270 NON-COVERED ITEM OR SERVICE: HCPCS

## 2018-11-30 PROCEDURE — 85730 THROMBOPLASTIN TIME PARTIAL: CPT

## 2018-11-30 PROCEDURE — 71045 X-RAY EXAM CHEST 1 VIEW: CPT

## 2018-11-30 PROCEDURE — 80061 LIPID PANEL: CPT

## 2018-11-30 PROCEDURE — 99225 PR SUBSEQUENT OBSERVATION CARE,LEVEL II: CPT | Mod: 25 | Performed by: INTERNAL MEDICINE

## 2018-11-30 PROCEDURE — C1769 GUIDE WIRE: HCPCS

## 2018-11-30 PROCEDURE — 93010 ELECTROCARDIOGRAM REPORT: CPT | Mod: 77 | Performed by: INTERNAL MEDICINE

## 2018-11-30 PROCEDURE — 92928 PRQ TCAT PLMT NTRAC ST 1 LES: CPT | Mod: LD | Performed by: INTERNAL MEDICINE

## 2018-11-30 PROCEDURE — 700111 HCHG RX REV CODE 636 W/ 250 OVERRIDE (IP)

## 2018-11-30 PROCEDURE — 83036 HEMOGLOBIN GLYCOSYLATED A1C: CPT

## 2018-11-30 PROCEDURE — 99153 MOD SED SAME PHYS/QHP EA: CPT

## 2018-11-30 PROCEDURE — 700105 HCHG RX REV CODE 258: Performed by: INTERNAL MEDICINE

## 2018-11-30 PROCEDURE — 93306 TTE W/DOPPLER COMPLETE: CPT | Mod: 26 | Performed by: INTERNAL MEDICINE

## 2018-11-30 PROCEDURE — 99226 PR SUBSEQUENT OBSERVATION CARE,LEVEL III: CPT | Performed by: INTERNAL MEDICINE

## 2018-11-30 PROCEDURE — 85610 PROTHROMBIN TIME: CPT

## 2018-11-30 PROCEDURE — 93567 NJX CAR CTH SPRVLV AORTGRPHY: CPT

## 2018-11-30 PROCEDURE — C1760 CLOSURE DEV, VASC: HCPCS

## 2018-11-30 PROCEDURE — A9270 NON-COVERED ITEM OR SERVICE: HCPCS | Performed by: INTERNAL MEDICINE

## 2018-11-30 PROCEDURE — C1725 CATH, TRANSLUMIN NON-LASER: HCPCS

## 2018-11-30 PROCEDURE — 93306 TTE W/DOPPLER COMPLETE: CPT

## 2018-11-30 PROCEDURE — C1887 CATHETER, GUIDING: HCPCS

## 2018-11-30 PROCEDURE — 700102 HCHG RX REV CODE 250 W/ 637 OVERRIDE(OP): Performed by: INTERNAL MEDICINE

## 2018-11-30 PROCEDURE — 360979 HCHG DIAGNOSTIC CATH

## 2018-11-30 PROCEDURE — 304952 HCHG R 2 PADS

## 2018-11-30 PROCEDURE — 700101 HCHG RX REV CODE 250

## 2018-11-30 PROCEDURE — 84443 ASSAY THYROID STIM HORMONE: CPT

## 2018-11-30 PROCEDURE — 80053 COMPREHEN METABOLIC PANEL: CPT

## 2018-11-30 PROCEDURE — 99152 MOD SED SAME PHYS/QHP 5/>YRS: CPT | Performed by: INTERNAL MEDICINE

## 2018-11-30 PROCEDURE — 93458 L HRT ARTERY/VENTRICLE ANGIO: CPT

## 2018-11-30 RX ORDER — SODIUM CHLORIDE 9 MG/ML
INJECTION, SOLUTION INTRAVENOUS CONTINUOUS
Status: ACTIVE | OUTPATIENT
Start: 2018-11-30 | End: 2018-11-30

## 2018-11-30 RX ORDER — EPTIFIBATIDE 0.75 MG/ML
INJECTION, SOLUTION INTRAVENOUS
Status: COMPLETED
Start: 2018-11-30 | End: 2018-11-30

## 2018-11-30 RX ORDER — EPTIFIBATIDE 2 MG/ML
INJECTION, SOLUTION INTRAVENOUS
Status: COMPLETED
Start: 2018-11-30 | End: 2018-11-30

## 2018-11-30 RX ORDER — LABETALOL HYDROCHLORIDE 5 MG/ML
INJECTION, SOLUTION INTRAVENOUS
Status: COMPLETED
Start: 2018-11-30 | End: 2018-11-30

## 2018-11-30 RX ORDER — PRASUGREL 10 MG/1
10 TABLET, FILM COATED ORAL DAILY
Status: DISCONTINUED | OUTPATIENT
Start: 2018-12-01 | End: 2018-12-01 | Stop reason: HOSPADM

## 2018-11-30 RX ORDER — MIDAZOLAM HYDROCHLORIDE 1 MG/ML
INJECTION INTRAMUSCULAR; INTRAVENOUS
Status: COMPLETED
Start: 2018-11-30 | End: 2018-11-30

## 2018-11-30 RX ORDER — HEPARIN SODIUM,PORCINE 1000/ML
VIAL (ML) INJECTION
Status: COMPLETED
Start: 2018-11-30 | End: 2018-11-30

## 2018-11-30 RX ORDER — PRASUGREL 10 MG/1
TABLET, FILM COATED ORAL
Status: COMPLETED
Start: 2018-11-30 | End: 2018-11-30

## 2018-11-30 RX ORDER — PRASUGREL 10 MG/1
60 TABLET, FILM COATED ORAL ONCE
Status: DISCONTINUED | OUTPATIENT
Start: 2018-11-30 | End: 2018-11-30

## 2018-11-30 RX ORDER — DOPAMINE HYDROCHLORIDE 160 MG/100ML
INJECTION, SOLUTION INTRAVENOUS
Status: COMPLETED
Start: 2018-11-30 | End: 2018-11-30

## 2018-11-30 RX ORDER — DIPHENHYDRAMINE HYDROCHLORIDE 50 MG/ML
INJECTION INTRAMUSCULAR; INTRAVENOUS
Status: COMPLETED
Start: 2018-11-30 | End: 2018-11-30

## 2018-11-30 RX ORDER — EPTIFIBATIDE 0.75 MG/ML
2 INJECTION, SOLUTION INTRAVENOUS CONTINUOUS
Status: DISPENSED | OUTPATIENT
Start: 2018-11-30 | End: 2018-11-30

## 2018-11-30 RX ORDER — VERAPAMIL HYDROCHLORIDE 2.5 MG/ML
INJECTION, SOLUTION INTRAVENOUS
Status: COMPLETED
Start: 2018-11-30 | End: 2018-11-30

## 2018-11-30 RX ORDER — FUROSEMIDE 10 MG/ML
INJECTION INTRAMUSCULAR; INTRAVENOUS
Status: COMPLETED
Start: 2018-11-30 | End: 2018-11-30

## 2018-11-30 RX ORDER — HYDRALAZINE HYDROCHLORIDE 20 MG/ML
INJECTION INTRAMUSCULAR; INTRAVENOUS
Status: COMPLETED
Start: 2018-11-30 | End: 2018-11-30

## 2018-11-30 RX ORDER — LIDOCAINE HYDROCHLORIDE 20 MG/ML
INJECTION, SOLUTION INFILTRATION; PERINEURAL
Status: COMPLETED
Start: 2018-11-30 | End: 2018-11-30

## 2018-11-30 RX ADMIN — LABETALOL HYDROCHLORIDE 20 MG: 5 INJECTION, SOLUTION INTRAVENOUS at 15:39

## 2018-11-30 RX ADMIN — HEPARIN SODIUM 2000 UNITS: 200 INJECTION, SOLUTION INTRAVENOUS at 14:26

## 2018-11-30 RX ADMIN — SODIUM CHLORIDE: 9 INJECTION, SOLUTION INTRAVENOUS at 09:05

## 2018-11-30 RX ADMIN — ASPIRIN 81 MG: 81 TABLET, COATED ORAL at 17:07

## 2018-11-30 RX ADMIN — EPTIFIBATIDE 2 MCG/KG/MIN: 0.75 INJECTION, SOLUTION INTRAVENOUS at 15:45

## 2018-11-30 RX ADMIN — EPTIFIBATIDE 25.2 MG: 2 INJECTION, SOLUTION INTRAVENOUS at 15:49

## 2018-11-30 RX ADMIN — DIPHENHYDRAMINE HYDROCHLORIDE 25 MG: 50 INJECTION, SOLUTION INTRAMUSCULAR; INTRAVENOUS at 15:39

## 2018-11-30 RX ADMIN — HEPARIN SODIUM: 1000 INJECTION, SOLUTION INTRAVENOUS; SUBCUTANEOUS at 14:26

## 2018-11-30 RX ADMIN — MIDAZOLAM HYDROCHLORIDE 2 MG: 1 INJECTION, SOLUTION INTRAMUSCULAR; INTRAVENOUS at 15:39

## 2018-11-30 RX ADMIN — LIDOCAINE HYDROCHLORIDE: 20 INJECTION, SOLUTION INFILTRATION; PERINEURAL at 14:26

## 2018-11-30 RX ADMIN — PRASUGREL 60 MG: 10 TABLET, FILM COATED ORAL at 15:49

## 2018-11-30 RX ADMIN — SODIUM CHLORIDE: 9 INJECTION, SOLUTION INTRAVENOUS at 17:40

## 2018-11-30 RX ADMIN — FENTANYL CITRATE 100 MCG: 50 INJECTION, SOLUTION INTRAMUSCULAR; INTRAVENOUS at 15:39

## 2018-11-30 RX ADMIN — FENTANYL CITRATE 50 MCG: 50 INJECTION, SOLUTION INTRAMUSCULAR; INTRAVENOUS at 15:49

## 2018-11-30 RX ADMIN — MIDAZOLAM HYDROCHLORIDE 2 MG: 1 INJECTION, SOLUTION INTRAMUSCULAR; INTRAVENOUS at 15:40

## 2018-11-30 RX ADMIN — FUROSEMIDE 20 MG: 10 INJECTION, SOLUTION INTRAMUSCULAR; INTRAVENOUS at 15:39

## 2018-11-30 RX ADMIN — VERAPAMIL HYDROCHLORIDE 5 MG: 2.5 INJECTION, SOLUTION INTRAVENOUS at 14:26

## 2018-11-30 RX ADMIN — NITROGLYCERIN 10 ML: 20 INJECTION INTRAVENOUS at 14:26

## 2018-11-30 ASSESSMENT — ENCOUNTER SYMPTOMS
SPEECH CHANGE: 0
NAUSEA: 0
SENSORY CHANGE: 0
VOMITING: 0
DIARRHEA: 0
SHORTNESS OF BREATH: 0
PALPITATIONS: 0
CONSTIPATION: 0
COUGH: 0
HEADACHES: 0
MYALGIAS: 0
FEVER: 0
TINGLING: 0
CHILLS: 0
DIZZINESS: 0
TREMORS: 0
WEAKNESS: 0
DOUBLE VISION: 0
ABDOMINAL PAIN: 0
BLURRED VISION: 0

## 2018-11-30 ASSESSMENT — COGNITIVE AND FUNCTIONAL STATUS - GENERAL
MOBILITY SCORE: 24
DAILY ACTIVITIY SCORE: 24
SUGGESTED CMS G CODE MODIFIER MOBILITY: CH
SUGGESTED CMS G CODE MODIFIER DAILY ACTIVITY: CH

## 2018-11-30 ASSESSMENT — PAIN SCALES - GENERAL
PAINLEVEL_OUTOF10: 0

## 2018-11-30 NOTE — CATH LAB
Immediate Post-Operative Note      PreOp Diagnosis: angina    PostOp Diagnosis: CAD and angina    Procedure(s) :  Coronary Angiography, Left Heart Catheterization, Left Ventriculography.Ascending aortogram/. 3.5 x 12 mm Xience stent proximal LAD    Surgeon(s):  Willian Coleman M.D.    Type of Anesthesia: Moderate Sedation    Specimen: None    Estimated Blood Loss: 20 cc's    Contrast Media:  448 cc's    Fluoro Time: 20.8 min    Xray DAP: 7313    Findings: Anomolous origin of RCA. 90% proximal LAD lesion. 90% mid OM1  Good LV function.  LAD stented with good result.  Due to contrast and fluoro time used to find RCA origin, Will defer OM1 inervention    Complications: none      Willian Coleman M.D.  11/30/2018 3:49 PM

## 2018-11-30 NOTE — ED NOTES
Pt to floor with Jose C RN. Pt norma.  Patient has chart and all belongings. Vitals up to date.

## 2018-11-30 NOTE — PROGRESS NOTES
Pt to cath lab with transport and RN, connected to [employer] monitor. Chart with pt. Belongings left with spouse

## 2018-11-30 NOTE — H&P
Hospital Medicine History & Physical Note    Date of Service  11/29/2018    Primary Care Physician  Brant Wheeler M.D.    Consultants  Cardiology - Dr isabel martin    Code Status  FULL CODE     Chief Complaint  Chest pain    History of Presenting Illness  58 y.o. male who presented 11/29/2018 with Chest pain    History of Parkison disease. Tremor. Dyslipidemia, hyperglycemia.     Patient reports that he has been having chest pain for the last 2-3 days, presenting to the emergency department for further evaluation of this today.  He reports that his chest pain is substernal, he characterizes this as a sharp pain, 4 out of 10 in intensity nonradiating and associated mostly whenever he is exerting for example with a flight of stairs or exertional activity with subsequent shortness of breath and dyspnea on exertion without any palpitations, lower extremity edema, orthopnea or paroxysmal nocturnal dyspnea.  To the point that he is unable to have any exertional activity at this time because it causes significant chest pain.  He reports that he would be unable to run on a treadmill for a stress test because of the chest pain that he develops with exertion, he is very certain regarding this finding.  At times he reports that his chest pain is severe to the point as if your breathing chilled air.  Otherwise no palpitations.  Denies any personal history of coronary artery disease or congestive heart failure.  Never smoked.  No family history of coronary artery disease or congestive heart failure.  No personal history of DVT or pulmonary embolism.    Review of Systems  Review of Systems   Constitutional: Negative for chills, diaphoresis, fever, malaise/fatigue and weight loss.   HENT: Negative for congestion, ear discharge, ear pain, hearing loss, nosebleeds, sinus pain, sore throat and tinnitus.    Eyes: Negative for blurred vision and double vision.   Respiratory: Positive for shortness of breath. Negative for cough,  hemoptysis and stridor.    Cardiovascular: Positive for chest pain. Negative for palpitations and PND.   Gastrointestinal: Negative for abdominal pain, blood in stool, constipation, diarrhea, heartburn, melena, nausea and vomiting.   Genitourinary: Negative for dysuria, flank pain, frequency, hematuria and urgency.   Musculoskeletal: Negative for back pain, falls, joint pain, myalgias and neck pain.   Skin: Negative for rash.   Neurological: Positive for tremors. Negative for dizziness, weakness and headaches.   Psychiatric/Behavioral: Negative for depression, hallucinations, memory loss, substance abuse and suicidal ideas. The patient is not nervous/anxious and does not have insomnia.        Past Medical History  Parkisnon disease  Tremor    Surgical History   has a past surgical history that includes hernia repair (Bilateral) and vasectomy (Bilateral).     Family History  family history includes Cancer in his father; GI in his mother.     Social History   reports that he has never smoked. He has never used smokeless tobacco. He reports that he drinks about 0.6 oz of alcohol per week . He reports that he does not use drugs.    Allergies  Allergies   Allergen Reactions   • Lipitor [Atorvastatin]      Arm stiffness       Medications  Prior to Admission Medications   Prescriptions Last Dose Informant Patient Reported? Taking?   Omega-3 Fatty Acids (FISH OIL) 1000 MG Cap capsule 11/29/2018 at AM Patient Yes Yes   Sig: Take 1,000 mg by mouth every day.      Facility-Administered Medications Last Administration Doses Remaining   aspirin (ASA) chewable tab 324 mg 11/29/2018  6:53 PM 0          Physical Exam  Temp:  [36.6 °C (97.9 °F)-36.7 °C (98 °F)] 36.7 °C (98 °F)  Pulse:  [82-85] 82  Resp:  [16-19] 19  BP: (154-172)/(105-108) 172/105    Physical Exam   Constitutional: He is oriented to person, place, and time. He appears well-developed and well-nourished. No distress.   HENT:   Head: Normocephalic.   Mouth/Throat:  Oropharynx is clear and moist. No oropharyngeal exudate.   Eyes: Pupils are equal, round, and reactive to light. Conjunctivae and EOM are normal. No scleral icterus.   Neck: Normal range of motion. No JVD present. No thyromegaly present.   Cardiovascular: Normal rate, regular rhythm and normal heart sounds.  Exam reveals no gallop and no friction rub.    No murmur heard.  Pulses:       Posterior tibial pulses are 2+ on the right side, and 2+ on the left side.   Cap refill < 3s   Pulmonary/Chest: No stridor. No respiratory distress. He has no wheezes. He has no rales.   Abdominal: Soft. Bowel sounds are normal. He exhibits no distension. There is no tenderness. There is no rebound and no guarding.   Musculoskeletal: He exhibits no edema, tenderness or deformity.   Pill rolling tremor R hand   Lymphadenopathy:     He has no cervical adenopathy.   Neurological: He is alert and oriented to person, place, and time. He has normal reflexes. No cranial nerve deficit.   Skin: Skin is warm and dry. He is not diaphoretic.   Psychiatric: He has a normal mood and affect. His behavior is normal. Thought content normal.       Laboratory:  Recent Labs      11/29/18   1552   WBC  7.1   RBC  5.45   HEMOGLOBIN  17.0   HEMATOCRIT  49.2   MCV  90.3   MCH  31.2   MCHC  34.6   RDW  40.7   PLATELETCT  282   MPV  9.5     Recent Labs      11/29/18   1552   SODIUM  139   POTASSIUM  3.8   CHLORIDE  104   CO2  23   GLUCOSE  151*   BUN  15   CREATININE  1.00   CALCIUM  9.4     Recent Labs      11/29/18   1552   ALTSGPT  22   ASTSGOT  16   ALKPHOSPHAT  80   TBILIRUBIN  0.7   LIPASE  34   GLUCOSE  151*     Recent Labs      11/29/18   1552   APTT  30.8   INR  1.00     Recent Labs      11/29/18   1552   BNPBTYPENAT  7         Recent Labs      11/29/18   1552   TROPONINI  <0.01       Urinalysis:    No results found     Imaging:  DX-CHEST-LIMITED (1 VIEW)   Final Result      No acute cardiopulmonary process is identified.      EC-ECHOCARDIOGRAM  COMPLETE W/O CONT    (Results Pending)         Assessment/Plan:  I anticipate this patient is appropriate for observation status at this time.    Chest pain- (present on admission)   Assessment & Plan    Concerning for angina    Non specific EKG - Lateral leads ST depressions, reviewed by me     CXRAY no mediastinal widening, reviewed by me     Admit to cdu for observation  CP protocol  Telemetry monitoring, cycle troponin's serial ekg  Echocardiogram    Echocardiogram   Cardiology consultation, discussed with Dr Mitchell  Cardiology to decide stress test vs angiographic evaluation given concerning history      Hyperglycemia- (present on admission)   Assessment & Plan    Check A1C     Tremor- (present on admission)   Assessment & Plan    Parkinson related  Patient to maintain outpatient neurology follow up   Check TSH     Dyslipidemia- (present on admission)   Assessment & Plan    Check lipid profile         VTE prophylaxis: SCD and SC Lovenox

## 2018-11-30 NOTE — PROGRESS NOTES
PT ARRIVED TO THE  UNIT ORIENTED TO ROOM DISCUSSED PLAN OF CARE BED IN LOW POSITION ALL QUESTIONS ANSWERED WAITING ON DR  CALL LIGHT WITHIN REACH NURSING HISTORY AND ASSESSMENT DONE CONDITION STABLE

## 2018-11-30 NOTE — ED PROVIDER NOTES
ED Provider Note    HPI: Patient is a 58-year-old male who presented to the emergency department November 29, 2018 at 3:25 PM with a chief complaint of exertional chest pain or shortness of breath.    For the last 2 days patient notes that when he exerts himself such as walking up stairs or walking uphill he developed substernal chest pressure and shortness of breath.  The pain does not radiate.  It is described like a sensation of breathing in cold air.  He also notes market dyspnea.  He has had no leg pain or leg swelling.  No fever no chills no cough no nausea no vomiting.  Symptoms resolved completely with about 30 seconds of rest.  Patient has chronic tremor due to Parkinson's disease which he states is unchanged from previous.  No other somatic complaint    Review of Systems: Positive for chronic Parkinson tremor chest pressure shortness of breath with exertion negative for leg pain leg swelling nausea vomiting fever chills cough.  Review of systems reviewed with patient, all other systems negative    Past medical/surgical history: Parkinson's disease    Medications: None    Allergies: Lipitor    Social History: No history of smoking positive social use of alcohol      Physical exam: Constitutional: Slender male awake alert  Vital signs: Temperature 97.9 pulse 85 respirations 16 blood pressure 154/108 repeat 139/100 pulse oximetry 95%  EYES: PERRL, EOMI, Conjunctivae and sclera normal, eyelids normal bilaterally.  Neck: Trachea midline. No cervical masses seen or palpated. Normal range of motion, supple. No meningeal signs elicited.  Cardiac: Regular rate and rhythm. S1-S2 present. No S3 or S4 present. No murmurs, rubs, or gallops heard. No edema or varicosities were seen.   Lungs: Clear to auscultation with good aeration throughout. No wheezes, rales, or rhonchi heard. Patient's chest wall moved symmetrically with each respiratory effort. Patient was not making use of accessory muscles of respiration in  breathing.  Abdomen: Soft nontender to palpation. No rebound or guarding elicited. No organomegaly identified. No pulsatile abdominal masses identified.   Musculoskeletal:  no  pain with palpitation or movement of muscle, bone or joint , no obvious musculoskeletal deformities identified.  Neurologic: alert and awake answers questions appropriately. Moves all four extremities independently, market right upper extremity tremors present which the patient states is chronic and unchanged due to Parkinson's disease. Normal strength and motor.  Skin: no rash or lesion seen, no palpable dermatologic lesions identified.  Psychiatric: Patient appears to be slightly anxious.  He was not delusional or hallucinating    Medical decision making: EKG obtained on arrival (interpretation) twelve-lead EKG atrial flutter rate 80.  Flutter waves may be present although this might represent artifact.  Present.  Morphology of QRS complexes unremarkable.  Questionable minimal ST depression in leads V4 through V6.  R wave progression normal.  Interpreted an abnormal EKG concerning for possible ischemia although not convincing    Patient noted to be somewhat hypertensive on arrival.  This may be due to the fact that he is somewhat anxious.  I do not think this had anything to do in particular with his presenting complaints.  He will follow with his primary care provider for recheck.    Chest x-ray obtained; no acute abnormalities were seen    Laboratory studies obtained (please see lab sheet for all results) significant findings included unremarkable CBC and chemistry panel.  Lipase normal at 34 making pancreatitis unlikely.Cardiac enzyme test coagulation studies were normal.    Patient be admitted for further evaluation by the hospitalist service.  His symptoms are certainly concerning for cardiac etiology given the relationship to exertion.  His signs and symptoms do not seem to go along with either pulmonary embolism or aortic  disease.    Further care and hospital course per attending physician summary    Impression 1) dyspnea on exertion  2) chest pain moderate likelihood cardiac etiology

## 2018-11-30 NOTE — PROGRESS NOTES
Pt sleeping in bed. Woke easily.  No distress noted.  No pain reported.  Educated pt on plan.  Bed in low position.  Will monitor

## 2018-11-30 NOTE — PROGRESS NOTES
[facility] Hospitalist Progress Note    Date of Service: 2018    Chief Complaint  58 y.o. Male with a h/o parkinson's dz, tremor, DLD admitted 2018 with chest pain.  Concern for angina.    Interval Problem Update  :  Echo stable.  Troponin negative.  Denies chest pain at this time.  VSS.  Pending angiogram.    Consultants/Specialty  Cardiology    Disposition  Anticipate home at d/c when medically clear.        Review of Systems   Constitutional: Negative for chills and fever.   HENT: Negative for congestion.    Eyes: Negative for blurred vision and double vision.   Respiratory: Negative for cough and shortness of breath.    Cardiovascular: Negative for chest pain, palpitations and leg swelling.   Gastrointestinal: Negative for abdominal pain, constipation, diarrhea, nausea and vomiting.   Genitourinary: Negative for dysuria.   Musculoskeletal: Negative for myalgias.   Skin: Negative for itching and rash.   Neurological: Negative for dizziness, tingling, tremors, sensory change, speech change, weakness and headaches.      Physical Exam  Laboratory/Imaging   Hemodynamics  Temp (24hrs), Av.7 °C (98 °F), Min:36.6 °C (97.9 °F), Max:36.7 °C (98 °F)   Temperature: 36.7 °C (98 °F)  Pulse  Av.7  Min: 74  Max: 87 Heart Rate (Monitored): 79  Blood Pressure: 138/91, NIBP: 132/99      Respiratory      Respiration: 18, Pulse Oximetry: 95 %, O2 Daily Delivery Respiratory : Room Air with O2 Available             Fluids  No intake or output data in the 24 hours ending 18 1447    Nutrition  Orders Placed This Encounter   Procedures   • Diet NPO     Standing Status:   Standing     Number of Occurrences:   1     Order Specific Question:   Restrict to:     Answer:   Strict [1]     Physical Exam   Constitutional: He is oriented to person, place, and time. He appears well-developed and well-nourished. No distress.   HENT:   Head: Normocephalic and atraumatic.   Mouth/Throat: No oropharyngeal exudate.   Eyes:  Conjunctivae are normal. Right eye exhibits no discharge. Left eye exhibits no discharge.   Neck: Normal range of motion. No tracheal deviation present.   Cardiovascular: Normal rate, regular rhythm, normal heart sounds and intact distal pulses.    No murmur heard.  Pulmonary/Chest: Effort normal and breath sounds normal. No stridor. No respiratory distress. He has no wheezes.   Abdominal: Soft. Bowel sounds are normal. He exhibits no distension. There is no tenderness. There is no rebound.   Musculoskeletal: Normal range of motion. He exhibits no edema.   Neurological: He is alert and oriented to person, place, and time. No cranial nerve deficit.   Skin: Skin is warm and dry. He is not diaphoretic. No erythema.   Psychiatric: He has a normal mood and affect.       Recent Labs      11/29/18   1552  11/30/18   0123   WBC  7.1  7.2   RBC  5.45  5.01   HEMOGLOBIN  17.0  15.7   HEMATOCRIT  49.2  45.6   MCV  90.3  91.0   MCH  31.2  31.3   MCHC  34.6  34.4   RDW  40.7  40.5   PLATELETCT  282  246   MPV  9.5  9.5     Recent Labs      11/29/18   1552  11/30/18   0123   SODIUM  139  141   POTASSIUM  3.8  3.6   CHLORIDE  104  110   CO2  23  22   GLUCOSE  151*  111*   BUN  15  15   CREATININE  1.00  0.92   CALCIUM  9.4  8.5     Recent Labs      11/29/18   1552  11/30/18   0123   APTT  30.8  30.2   INR  1.00  1.03     Recent Labs      11/29/18   1552   BNPBTYPENAT  7     Recent Labs      11/30/18   0123   TRIGLYCERIDE  158*   HDL  36*   LDL  114*          Assessment/Plan     Chest pain- (present on admission)   Assessment & Plan    Concerning for angina  Non specific EKG - Lateral leads ST depressions, reviewed by me   CXRAY no mediastinal widening  Echo stable with EF 55%  Troponin negative.  VSS.  Pending angiogram.  Continue ASA  Telemetry monitoring  Cardiology following.       Hypertension   Assessment & Plan    Start metoprolol.     Hyperglycemia- (present on admission)   Assessment & Plan    A1C 5.9  No need for  treatment at this time.     Tremor- (present on admission)   Assessment & Plan    Parkinson related  Patient to maintain outpatient neurology follow up        Dyslipidemia- (present on admission)   Assessment & Plan    Intolerance to statins.  Mildly elevated lipid panel.  Consider alternatives prior to discharge.       Quality-Core Measures   Reviewed items::  Labs reviewed, Medications reviewed, Radiology images reviewed and EKG reviewed  Vargas catheter::  No Vargas  DVT prophylaxis pharmacological::  Enoxaparin (Lovenox)  Ulcer Prophylaxis::  Not indicated

## 2018-11-30 NOTE — DISCHARGE PLANNING
Care Transition Team Assessment    Spoke with patient at bedside. Spouse will be ride hme. Anticipate no needs @ present time.    Information Source  Orientation : Oriented x 4  Information Given By: Patient    Readmission Evaluation  Is this a readmission?: No    Interdisciplinary Discharge Planning  Does Admitting Nurse Feel This Could be a Complex Discharge?: No  Primary Care Physician: Brant Wheeler  Lives with - Patient's Self Care Capacity: Spouse  Patient or legal guardian wants to designate a caregiver (see row info): No  Support Systems: Spouse / Significant Other  Housing / Facility: [address]  Do You Take your Prescribed Medications Regularly: Yes  Able to Return to Previous ADL's: Yes  Mobility Issues: No  Prior Services: None  Patient Expects to be Discharged to:: Home  Assistance Needed: No  Durable Medical Equipment: Not Applicable    Discharge Preparedness  What are your discharge supports?: Spouse  Prior Functional Level: Ambulatory  Difficulity with ADLs: None    Functional Assesment  Prior Functional Level: Ambulatory    Finances  Prescription Coverage: Yes    Anticipated Discharge Information  Anticipated discharge disposition: Home  Discharge Address: [address] [place] [address]  Discharge Contact Phone Number: [phone]

## 2018-11-30 NOTE — CONSULTS
Cardiology Initial Consultation    Date of Service  11/29/2018    Referring Physician  Dr. Iyer    Reason for Consultation  Chest pain    History of Presenting Illness  Pantera Parker is a 58 y.o. male with a past medical history of Parkinson's disease diagnosed 2 years ago recently seen by Dr. Sho koroma of neurology.    He is with his wife and can position himself healthy and active.  In fact he cannot remember ever going to the emergency room before    His father was born and raised in [facility].  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  Say he loves his life and is outdoors a lot.  In the last few weeks he has been getting exertional chest discomfort.  It is extremely predictable, he is certain if he got out of bed and walked around particularly in an incline it would happen within the first 500 yards.  This is associated with breathlessness.  His blood pressure has been fine.  He has never had this problem before better with rest worse with exertion, not positional not related to stress.  No new medicines or stressors at home    For this reason he asked his wife to take him to urgent care.  They heard his problems and sent him here.  He currently feels well    Of note he has no allergies to statins although he was on a high dose 1 once and thought it gave him muscle aches.  He has had no trouble on aspirin in the past      Review of Systems  Review of Systems   Constitutional: Positive for activity change and fatigue. Negative for appetite change and unexpected weight change.   Eyes: Negative for discharge and itching.   Respiratory: Negative for apnea, cough, choking, wheezing and stridor.    Cardiovascular: Negative for palpitations and leg swelling.   Gastrointestinal: Negative for abdominal distention and abdominal  pain.   Endocrine: Negative for cold intolerance and heat intolerance.   Genitourinary: Negative for difficulty urinating and dysuria.   Musculoskeletal: Negative for arthralgias, back pain and myalgias.   Allergic/Immunologic: Negative for environmental allergies and food allergies.   Neurological: Positive for tremors. Negative for dizziness, syncope, facial asymmetry, speech difficulty and weakness.   Hematological: Negative for adenopathy. Does not bruise/bleed easily.   Psychiatric/Behavioral: Negative for agitation, behavioral problems and self-injury. The patient is not nervous/anxious.    All other systems reviewed and are negative.      Past Medical History  As above    Surgical History   has a past surgical history that includes hernia repair (Bilateral) and vasectomy (Bilateral).    Family History  family history includes Cancer in his father; GI in his mother.    Social History   reports that he has never smoked. He has never used smokeless tobacco. He reports that he drinks about 0.6 oz of alcohol per week . He reports that he does not use drugs.    Medications  Prior to Admission Medications   Prescriptions Last Dose Informant Patient Reported? Taking?   Omega-3 Fatty Acids (FISH OIL) 1000 MG Cap capsule 11/29/2018 at AM Patient Yes Yes   Sig: Take 1,000 mg by mouth every day.      Facility-Administered Medications Last Administration Doses Remaining   aspirin (ASA) chewable tab 324 mg 11/29/2018  6:53 PM 0          Allergies  Allergies   Allergen Reactions   • Lipitor [Atorvastatin]      Arm stiffness       Vital signs in last 24 hours  Temp:  [36.6 °C (97.9 °F)-36.7 °C (98 °F)] 36.7 °C (98 °F)  Pulse:  [82-87] 87  Resp:  [16-19] 16  BP: (135-172)/() 135/84    Physical Exam  Physical Exam   Constitutional: He is oriented to person, place, and time. He appears well-developed and well-nourished.   Eyes: Pupils are equal, round, and reactive to light. Conjunctivae and EOM are normal. No scleral  icterus.   Neck: Neck supple. No JVD present. No tracheal deviation present. No thyromegaly present.   Cardiovascular: Normal rate, regular rhythm and intact distal pulses.    No murmur heard.  Pulmonary/Chest: Breath sounds normal. No respiratory distress.   Abdominal: Soft. Bowel sounds are normal.   Musculoskeletal: He exhibits no edema or tenderness.   Neurological: He is alert and oriented to person, place, and time. He displays normal reflexes. No cranial nerve deficit. He exhibits normal muscle tone.   Mild pill-rolling tremor of the right hand much less noticeably the left hand   Skin: Skin is warm and dry. No rash noted.   Psychiatric: He has a normal mood and affect. His behavior is normal.       Lab Review  Lab Results   Component Value Date/Time    WBC 7.1 11/29/2018 03:52 PM    RBC 5.45 11/29/2018 03:52 PM    HEMOGLOBIN 17.0 11/29/2018 03:52 PM    HEMATOCRIT 49.2 11/29/2018 03:52 PM    MCV 90.3 11/29/2018 03:52 PM    MCH 31.2 11/29/2018 03:52 PM    MCHC 34.6 11/29/2018 03:52 PM    MPV 9.5 11/29/2018 03:52 PM      Lab Results   Component Value Date/Time    SODIUM 139 11/29/2018 03:52 PM    POTASSIUM 3.8 11/29/2018 03:52 PM    CHLORIDE 104 11/29/2018 03:52 PM    CO2 23 11/29/2018 03:52 PM    GLUCOSE 151 (H) 11/29/2018 03:52 PM    BUN 15 11/29/2018 03:52 PM    CREATININE 1.00 11/29/2018 03:52 PM    BUNCREATRAT 18 02/21/2018 09:08 AM      Lab Results   Component Value Date/Time    ASTSGOT 16 11/29/2018 03:52 PM    ALTSGPT 22 11/29/2018 03:52 PM     Lab Results   Component Value Date/Time    CHOLSTRLTOT 187 02/21/2018 09:08 AM     (H) 02/21/2018 09:08 AM    HDL 50 02/21/2018 09:08 AM    TRIGLYCERIDE 99 02/21/2018 09:08 AM    TROPONINI <0.01 11/29/2018 07:59 PM       Recent Labs      11/29/18   1552   BNPBTYPENAT  7       Cardiac Imaging and Procedures Review  EKG:  My personal interpretation of the EKG dated today normal sinus rhythm no ischemic changes    Imaging  Chest X-Ray: No infiltrate or  effusion    Assessment/Plan    Exertional chest discomfort, high risk features including low level exertion highly suggest acute coronary syndrome  Resting tremor  Statin intolerance in the past    -Talked at length about the risk and benefit of a workup.  Although his troponin is normal his story is certainly highly concerning for underlying coronary disease that is flow-limiting.  Talked about the risks and benefits of an invasive workup.  Agreed to proceed, talked even about possibility of stenting complications or even cardiac surgery.  -N.p.o. after midnight  -Talked at length about blood pressure which is been high, has as needed medicine for this and I discussed this with nursing as well  -I also ordered an echocardiogram to better characterize any structural heart disease    Thank you for allowing me to participate in the care of this patient.  Will follow with you.    Please contact me with any questions.    Avelina Mitchell M.D.   Cardiologist, [facility]  [phone]

## 2018-11-30 NOTE — PROGRESS NOTES
"Cardiology Follow-up Consult Note    Date of Service:    11/30/2018      Consulting Physician: Cullen Iyer M.D.    Patient ID:    Name:   Pantera Parker     YOB: 1960  Age:   58 y.o.  male   MRN:   [id number]      Reason for Consultation: Chest pain    HPI/Patient ID:    59 yo M with parkinson's who presents with angina.        Interim Events:  # Patient denies chest pain, palpitations, dyspnea on exertion, pre-syncope, syncope, lower extremity swelling, orthopnea, PND or recent weight gain.         Past medical, surgical, social, and family history reviewed and unchanged from admission except as noted in assessment and plan.    Medications: Reviewed in MAR      Allergies   Allergen Reactions   • Lipitor [Atorvastatin]      Arm stiffness           Physical Exam  Body mass index is 22.33 kg/m².  Blood pressure 140/90, pulse 74, temperature 36.7 °C (98 °F), temperature source Temporal, resp. rate 18, height 1.778 m (5' 10\"), weight 70.6 kg (155 lb 10.3 oz), SpO2 96 %.  Vitals:    11/29/18 2257 11/30/18 0028 11/30/18 0527 11/30/18 0828   BP:  133/81 141/96 140/90   Pulse:  79 80 74   Resp: 18 14 16 18   Temp:  36.6 °C (97.9 °F) 36.7 °C (98 °F) 36.7 °C (98 °F)   TempSrc:  Temporal Temporal Temporal   SpO2:  95% 96% 96%   Weight:       Height:         Oxygen Therapy:  Pulse Oximetry: 96 %, O2 (LPM): 0, O2 Delivery: None (Room Air)    General: Well appearing and in no apparent distress  Eyes: nl conjunctiva  ENT: OP clear  Neck: JVP  cm H2O, no carotid bruits  Lungs: normal respiratory effort, CTAB  Heart: RRR, no murmurs, no rubs or gallops, no edema bilateral lower extremities. No LV/RV heave on cardiac palpatation. 2+ bilateral radial pulses.  2+ bilateral dp pulses.   Abdomen: soft, non tender, non distended, no masses, normal bowel sounds.  No HSM.  Extremities/MSK: no clubbing, no cyanosis  Neurological: No focal sensory deficits. Tremor noted.   Psychiatric: Appropriate affect, A/O x 3  Skin: Warm " extremities      Labs (personally reviewed and notable for):   Lab Results   Component Value Date/Time    SODIUM 141 11/30/2018 01:23 AM    POTASSIUM 3.6 11/30/2018 01:23 AM    CHLORIDE 110 11/30/2018 01:23 AM    CO2 22 11/30/2018 01:23 AM    GLUCOSE 111 (H) 11/30/2018 01:23 AM    BUN 15 11/30/2018 01:23 AM    CREATININE 0.92 11/30/2018 01:23 AM    BUNCREATRAT 18 02/21/2018 09:08 AM      Lab Results   Component Value Date/Time    WBC 7.2 11/30/2018 01:23 AM    RBC 5.01 11/30/2018 01:23 AM    HEMOGLOBIN 15.7 11/30/2018 01:23 AM    HEMATOCRIT 45.6 11/30/2018 01:23 AM    MCV 91.0 11/30/2018 01:23 AM    MCH 31.3 11/30/2018 01:23 AM    MCHC 34.4 11/30/2018 01:23 AM    MPV 9.5 11/30/2018 01:23 AM    NEUTSPOLYS 61.50 11/29/2018 03:52 PM    LYMPHOCYTES 28.30 11/29/2018 03:52 PM    MONOCYTES 7.80 11/29/2018 03:52 PM    EOSINOPHILS 1.70 11/29/2018 03:52 PM    BASOPHILS 0.40 11/29/2018 03:52 PM              Cardiac Imaging and Procedures Review:    EKG dated 11/30/18:my personal intreptation is NSR, LAFB.       Radiology test Review:  CXR: No acute cardiopulmonary process is identified.      Impression and Medical Decision Making:  # Chest pain, likely secondary to angina and obstructive CAD  # Parkinson's disease  # Hypertension    Recommendations:  # proceed with [facility] today.  # aspirin 81mg PO Daily  # allergy to lipitor, will discuss initiation of another statin vs zetia  # DAPT if stent placed, TBD by cath  # ntg prn  # metoprolol 25mg PO bid  # anticipate DC early tomorrow.       Thank you for allowing me to participate in the care of this patient, I will continue to follow.  Please contact me with any questions.      Jayce Vilchis MD  Cardiologist, [facility] [place]   [phone]

## 2018-11-30 NOTE — PROGRESS NOTES
Assumed pt care at 0700. Received report from Dahlia MO. A&O x4. Pt denies pain at this time. Respirations even and unlabored on RA. Cardiac monitor in place, SR noted. Pt reports no chest pain, SOB, nausea, or palpitations. Pt performing oral care.  Updated on POC, communication board updated. Bed locked and in lowest position. Call light and belongings within reach. Non-skid socks in place. Needs met, will continue to monitor.

## 2018-11-30 NOTE — CARE PLAN
Problem: Safety  Goal: Will remain free from injury  Outcome: PROGRESSING AS EXPECTED  Place pt call light and belongings within reached, pt calls approriately; Instructed to call for assistance, Risk for fall education implemented; Non-skid socks on when OOB; Bed lowered and locked, upper siderails up.     Problem: Discharge Barriers/Planning  Goal: Patient's continuum of care needs will be met  Outcome: PROGRESSING AS EXPECTED  Pt with a c/o chest pain. Will be admitted for cardiac monitoring, serial troponin, ECHO, stress test vs angiogram (cardiology consult)

## 2018-12-01 VITALS
BODY MASS INDEX: 22.28 KG/M2 | DIASTOLIC BLOOD PRESSURE: 84 MMHG | HEIGHT: 70 IN | OXYGEN SATURATION: 91 % | HEART RATE: 86 BPM | TEMPERATURE: 98.3 F | RESPIRATION RATE: 18 BRPM | SYSTOLIC BLOOD PRESSURE: 128 MMHG | WEIGHT: 155.65 LBS

## 2018-12-01 LAB
ANION GAP SERPL CALC-SCNC: 11 MMOL/L (ref 0–11.9)
BASOPHILS # BLD AUTO: 0.2 % (ref 0–1.8)
BASOPHILS # BLD: 0.03 K/UL (ref 0–0.12)
BUN SERPL-MCNC: 18 MG/DL (ref 8–22)
CALCIUM SERPL-MCNC: 8.6 MG/DL (ref 8.5–10.5)
CHLORIDE SERPL-SCNC: 105 MMOL/L (ref 96–112)
CO2 SERPL-SCNC: 23 MMOL/L (ref 20–33)
CREAT SERPL-MCNC: 1.19 MG/DL (ref 0.5–1.4)
EOSINOPHIL # BLD AUTO: 0.04 K/UL (ref 0–0.51)
EOSINOPHIL NFR BLD: 0.3 % (ref 0–6.9)
ERYTHROCYTE [DISTWIDTH] IN BLOOD BY AUTOMATED COUNT: 41.1 FL (ref 35.9–50)
ERYTHROCYTE [DISTWIDTH] IN BLOOD BY AUTOMATED COUNT: 41.8 FL (ref 35.9–50)
GLUCOSE SERPL-MCNC: 156 MG/DL (ref 65–99)
HCT VFR BLD AUTO: 46.2 % (ref 42–52)
HCT VFR BLD AUTO: 47.3 % (ref 42–52)
HGB BLD-MCNC: 15.6 G/DL (ref 14–18)
HGB BLD-MCNC: 16.1 G/DL (ref 14–18)
IMM GRANULOCYTES # BLD AUTO: 0.03 K/UL (ref 0–0.11)
IMM GRANULOCYTES NFR BLD AUTO: 0.2 % (ref 0–0.9)
LYMPHOCYTES # BLD AUTO: 2.19 K/UL (ref 1–4.8)
LYMPHOCYTES NFR BLD: 15.9 % (ref 22–41)
MCH RBC QN AUTO: 30.6 PG (ref 27–33)
MCH RBC QN AUTO: 30.7 PG (ref 27–33)
MCHC RBC AUTO-ENTMCNC: 33.8 G/DL (ref 33.7–35.3)
MCHC RBC AUTO-ENTMCNC: 34 G/DL (ref 33.7–35.3)
MCV RBC AUTO: 90.1 FL (ref 81.4–97.8)
MCV RBC AUTO: 90.6 FL (ref 81.4–97.8)
MONOCYTES # BLD AUTO: 0.83 K/UL (ref 0–0.85)
MONOCYTES NFR BLD AUTO: 6 % (ref 0–13.4)
NEUTROPHILS # BLD AUTO: 10.65 K/UL (ref 1.82–7.42)
NEUTROPHILS NFR BLD: 77.4 % (ref 44–72)
NRBC # BLD AUTO: 0 K/UL
NRBC BLD-RTO: 0 /100 WBC
PLATELET # BLD AUTO: 282 K/UL (ref 164–446)
PLATELET # BLD AUTO: 295 K/UL (ref 164–446)
PMV BLD AUTO: 9.5 FL (ref 9–12.9)
PMV BLD AUTO: 9.6 FL (ref 9–12.9)
POTASSIUM SERPL-SCNC: 3.9 MMOL/L (ref 3.6–5.5)
RBC # BLD AUTO: 5.1 M/UL (ref 4.7–6.1)
RBC # BLD AUTO: 5.25 M/UL (ref 4.7–6.1)
SODIUM SERPL-SCNC: 139 MMOL/L (ref 135–145)
WBC # BLD AUTO: 13.8 K/UL (ref 4.8–10.8)
WBC # BLD AUTO: 14.1 K/UL (ref 4.8–10.8)

## 2018-12-01 PROCEDURE — 80048 BASIC METABOLIC PNL TOTAL CA: CPT

## 2018-12-01 PROCEDURE — 36415 COLL VENOUS BLD VENIPUNCTURE: CPT

## 2018-12-01 PROCEDURE — 99225 PR SUBSEQUENT OBSERVATION CARE,LEVEL II: CPT | Performed by: INTERNAL MEDICINE

## 2018-12-01 PROCEDURE — 85025 COMPLETE CBC W/AUTO DIFF WBC: CPT

## 2018-12-01 PROCEDURE — A9270 NON-COVERED ITEM OR SERVICE: HCPCS | Performed by: INTERNAL MEDICINE

## 2018-12-01 PROCEDURE — G0378 HOSPITAL OBSERVATION PER HR: HCPCS

## 2018-12-01 PROCEDURE — 85027 COMPLETE CBC AUTOMATED: CPT

## 2018-12-01 PROCEDURE — 700102 HCHG RX REV CODE 250 W/ 637 OVERRIDE(OP): Performed by: INTERNAL MEDICINE

## 2018-12-01 PROCEDURE — 99217 PR OBSERVATION CARE DISCHARGE: CPT | Performed by: INTERNAL MEDICINE

## 2018-12-01 RX ORDER — PRASUGREL 10 MG/1
10 TABLET, FILM COATED ORAL DAILY
Qty: 90 TAB | Refills: 6 | Status: SHIPPED | OUTPATIENT
Start: 2018-12-02 | End: 2018-12-31 | Stop reason: SDUPTHER

## 2018-12-01 RX ORDER — ASPIRIN 81 MG/1
81 TABLET ORAL DAILY
Qty: 100 TAB | Refills: 0 | Status: SHIPPED | OUTPATIENT
Start: 2018-12-02 | End: 2019-02-19 | Stop reason: SDUPTHER

## 2018-12-01 RX ORDER — ROSUVASTATIN CALCIUM 5 MG/1
5 TABLET, COATED ORAL EVERY EVENING
Status: DISCONTINUED | OUTPATIENT
Start: 2018-12-01 | End: 2018-12-01

## 2018-12-01 RX ORDER — NITROGLYCERIN 0.4 MG/1
0.4 TABLET SUBLINGUAL PRN
Qty: 25 TAB | Refills: 0 | Status: SHIPPED | OUTPATIENT
Start: 2018-12-01

## 2018-12-01 RX ORDER — ROSUVASTATIN CALCIUM 5 MG/1
5 TABLET, COATED ORAL EVERY EVENING
Status: DISCONTINUED | OUTPATIENT
Start: 2018-12-01 | End: 2018-12-01 | Stop reason: HOSPADM

## 2018-12-01 RX ORDER — ROSUVASTATIN CALCIUM 5 MG/1
5 TABLET, COATED ORAL EVERY EVENING
Qty: 30 TAB | Refills: 0 | Status: SHIPPED | OUTPATIENT
Start: 2018-12-01 | End: 2018-12-13 | Stop reason: SDUPTHER

## 2018-12-01 RX ADMIN — METOPROLOL TARTRATE 25 MG: 25 TABLET, FILM COATED ORAL at 05:45

## 2018-12-01 RX ADMIN — ASPIRIN 81 MG: 81 TABLET, COATED ORAL at 05:46

## 2018-12-01 RX ADMIN — PRASUGREL 10 MG: 10 TABLET, FILM COATED ORAL at 05:46

## 2018-12-01 ASSESSMENT — ENCOUNTER SYMPTOMS
STRIDOR: 0
CHOKING: 0
COUGH: 0
APNEA: 0
CHEST TIGHTNESS: 0
SHORTNESS OF BREATH: 0

## 2018-12-01 ASSESSMENT — PAIN SCALES - GENERAL
PAINLEVEL_OUTOF10: 0
PAINLEVEL_OUTOF10: 0

## 2018-12-01 NOTE — PROGRESS NOTES
Cardiology Follow Up Progress Note    Date of Service  12/1/2018    Attending Physician  Iban Fried M.D.    Reason for consultation         History of     Parkinson's disease, tremor, dyslipidemia, hyperglycemia    Admitted for   Chest pain      Interim Events    12/1/18, no overnight issues, right groin without evidence of hematoma, he will ambulate prior to discharge, we discussed dual antiplatelet therapy.      11/30/18, [facility], s/p stenting of greater than 90% lesion in the proximal LAD, 90% lesion in the proximal third of the first marginal artery not amenable to PCI.  Continue with medical therapy in addition he has 40% lesion in the LAD distal to the origin and 30% lesion in the    Review of Systems  Review of Systems   Respiratory: Negative for apnea, cough, choking, chest tightness, shortness of breath and stridor.    Cardiovascular: Negative for chest pain and leg swelling.       Vital signs in last 24 hours  Temp:  [35.9 °C (96.7 °F)-37.1 °C (98.8 °F)] 37.1 °C (98.8 °F)  Pulse:  [76-95] 87  Resp:  [16-20] 18  BP: ()/(66-96) 119/76    Physical Exam  Physical Exam   Constitutional: He is oriented to person, place, and time. He appears well-developed.   HENT:   Head: Normocephalic.   Eyes: Conjunctivae are normal.   Neck: No JVD present.   Cardiovascular: Normal rate and regular rhythm.    Pulses:       Carotid pulses are 2+ on the right side, and 2+ on the left side.       Radial pulses are 2+ on the right side, and 2+ on the left side.        Posterior tibial pulses are 2+ on the right side, and 2+ on the left side.   Pulmonary/Chest: He has no wheezes.   Abdominal: Soft.   Musculoskeletal: He exhibits no edema.   Neurological: He is alert and oriented to person, place, and time.   Skin: Skin is warm and dry.   R groin  without evidence of hematoma       Lab Review  Lab Results   Component Value Date/Time    WBC 14.1 (H) 12/01/2018 03:08 AM    RBC 5.25 12/01/2018 03:08 AM    HEMOGLOBIN 16.1 12/01/2018  03:08 AM    HEMATOCRIT 47.3 12/01/2018 03:08 AM    MCV 90.1 12/01/2018 03:08 AM    MCH 30.7 12/01/2018 03:08 AM    MCHC 34.0 12/01/2018 03:08 AM    MPV 9.6 12/01/2018 03:08 AM      Lab Results   Component Value Date/Time    SODIUM 139 12/01/2018 03:08 AM    POTASSIUM 3.9 12/01/2018 03:08 AM    CHLORIDE 105 12/01/2018 03:08 AM    CO2 23 12/01/2018 03:08 AM    GLUCOSE 156 (H) 12/01/2018 03:08 AM    BUN 18 12/01/2018 03:08 AM    CREATININE 1.19 12/01/2018 03:08 AM    BUNCREATRAT 18 02/21/2018 09:08 AM      Lab Results   Component Value Date/Time    ASTSGOT 16 11/30/2018 01:23 AM    ALTSGPT 15 11/30/2018 01:23 AM     Lab Results   Component Value Date/Time    CHOLSTRLTOT 182 11/30/2018 01:23 AM     (H) 11/30/2018 01:23 AM    HDL 36 (A) 11/30/2018 01:23 AM    TRIGLYCERIDE 158 (H) 11/30/2018 01:23 AM    TROPONINI <0.01 11/29/2018 07:59 PM       Recent Labs      11/29/18   1552   BNPBTYPENAT  7         Assessment    Coronary artery disease s/p PCI to proximal LAD 11/30/18    Parkinson's disease    Dyslipidemia    Hyperglycemia    A1c 5.9    Echocardiogram 11/30/18, LVEF 55%    Plan    Plan is to continue with optimal guided medical therapy    Continue with aspirin 81 mg, Effient 10 mg, metoprolol 25 mg, patient has allergy to Lipitor with muscle pain and weakness to his arms after a long plane  flight, trial of low dose  Crestor, he is agreeable.    Appointment with the cardiology office in 2-3 weeks

## 2018-12-01 NOTE — PROCEDURES
DATE OF SERVICE:  11/30/2018    PROCEDURES:  1.  Supervision of conscious sedation.  2.  Selective coronary angiography.  3.  Left heart catheterization.  4.  Left ventriculography  5.  Ascending aortography.  6.  Stenting of proximal LAD with 3.5x12 mm Xience Lila stent.    INDICATIONS:  The patient is a 58-year-old gentleman admitted to hospital with   anginal chest pain.  He is undergoing coronary angiography at this time to   determine the extent of his coronary artery disease.    DESCRIPTION OF PROCEDURE:  The right groin was sterilely prepped and draped in   the usual fashion and the right femoral head was identified under   fluoroscopy.  The patient was premedicated with Versed 1 mg and fentanyl 50   mcg.  He received additional Versed and fentanyl during the procedure.  He   also received intravenous labetalol for blood pressure control and intravenous   Benadryl for sedation and tremor.    The area of the right femoral artery was anesthetized with 2% lidocaine and   the artery was easily entered.  A 4-Chinese sheath was placed.  Next, a   4-Chinese #4 left Esdras catheter was placed and advanced into the ostium of   the left main coronary artery where selective angiograms were performed.    Following this, attempts were made to find the right coronary artery with no   torque right catheter, but this was not successful.  Further attempts were   made to locate the ostium of the right coronary artery with AR1, this was   unsuccessful.  Therefore, a pigtail catheter was placed and a left heart   catheterization was performed.  This was followed by left ventriculogram using   24 mL of contrast.  Left heart pullbacks were performed and the pigtail   catheter was repositioned in the ascending aorta and ascending aortogram was   performed.    Further attempts were made to locate the ostium of the right coronary artery,   which was felt to be probably anomalous.  Attempts were made using a left   4-Chinese Amplatz  catheter and a multipurpose catheter.  Finally, the 4-Guamanian   sheath was upsized to a 6-Guamanian sheath and a hockey stick guide was placed.    There was some evidence that the right coronary artery is arising from the   left coronary cusp near the left main.  The left Amplatz was reinserted and   the right coronary was eventually located with anomalous origin in the left   coronary cusp.    Following this, a coronary intervention was performed.  Patient was started on   5000 units of heparin and an EBU guide was placed.  A BMW wire was placed   across the lesion in the LAD and lesion was angioplastied with a 3.5 mm   balloon and then stented with a 3.5x12 mm Lila stent deployed at 12   atmospheres and then postdilated at 12 atmospheres with noncompliant balloon.    Final angiograms revealed a good result with no residual stenosis and PADILLA-3   flow into the terminus of the LAD.  Because of limitations of contrast media   and fluoroscopy time, it was elected to defer the intervention on the OM1 to a   later date.  Therefore, the guiding catheter was removed.  A small injection   of contrast was given through the arterial sheath.  The sheath was then   removed, and hemostasis was obtained using a Perclose device.    COMPLICATIONS:  There were no complications.    ESTIMATED BLOOD LOSS:  20 mL.    FLUOROSCOPY TIME:  20.8 minutes.    X-RAY DOSE-AREA PRODUCT:  7,313.    TOTAL CONTRAST MEDIA:  440 mL of Omnipaque 350.    Conscious sedation supervision was initiated at 2:35 p.m. and completed at   3:45 p.m.    HEMODYNAMICS:  Reveal sinus rhythm throughout procedure.  Aortic pressure   129/85 mmHg.  Left ventricular pressure 153/16 mmHg.    Fluoroscopy of heart is unremarkable.    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  Distally, this right coronary artery gives rise to a moderate sized PDA   and then a small posterior left ventricular branch.  There is minor plaquing   in the  right coronary artery proximally, but no significant stenoses noted.    The left main is free of disease and bifurcates into the LAD and circumflex.    The LAD has a proximal stenosis of greater than 90% proximal to the first   diagonal artery.  The diagonal artery itself is a moderate size vessel with a   75% stenosis proximally followed by an eccentric 50% stenosis.  Beyond the   first diagonal artery, the LAD has a 30% stenosis and distally, the LAD   supplies the apex and inferoapical segment of the left ventricle.    Post-interventional images reveal widely patent stent in the proximal LAD.    There is PADILLA 3 flow to the terminus of the LAD.  Distal to the stented   segment is a 30-40% stenosis.  This is just distal to the origin of the   diagonal artery and was noted pre-intervention as well.    The circumflex is a large caliber vessel and gives rise to a moderate sized   first marginal artery.  This marginal artery has a high-grade stenosis in its   proximal third that is best seen in the [place] Zealander cranial view.  This is at least   90% in severity.  Beyond this, the circumflex gives rise to a left atrial   branch and a larger second marginal artery and then terminates in a similar   sized third marginal artery.  The proximal third marginal artery has plaquing   in its proximal segment, but this is not angiographically significant.  The   left ventriculogram demonstrates normal segmental wall motion.  The ascending   aorta is unremarkable.  Estimated ejection fraction is 60%.    The ascending aortogram demonstrates no evidence of aortic insufficiency and   an unremarkable ascending aorta.    IMPRESSION:  1.  Successful stenting of greater than 90% lesion in the proximal LAD with a   3.5x12 mm Xience Lila stent.  2.  A 90% lesion in the proximal third of the first marginal artery.  3.  A 40% lesion in the LAD just distal to the origin of the first diagonal   artery.  4.  A 30% lesion in the mid left anterior  descending.  5.  Sequential 75 and 50% lesions in the proximal portion of the diagonal   artery.  6.  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  7.  Normal left ventricular systolic function.       ____________________________________     MD CAMILA BAUER / NAVIN    DD:  11/30/2018 16:19:42  DT:  11/30/2018 16:43:16    D#:  [id number]  Job#:  [id number]

## 2018-12-01 NOTE — PROGRESS NOTES
"Patient O2 sat frequently dropping between 87 to 89 percent. Patient refuses to wear oxygen stating \"it makes him nauseous.\" Education provided to patient regarding the importantance of maintaining O2 sats above 90 percent, patient verbalized understanding but continues to refuse. Zofran was offered for stated nausea, however, patient continues to refuse medication.  in place.    "

## 2018-12-01 NOTE — PROGRESS NOTES
Pt stated nausea, zofran pulled from [facility]. When preparing to give medication patient changed his mind and no longer wants this med. Zofran was held and placed in patient drawer potentially for future use.

## 2018-12-01 NOTE — CARE PLAN
Problem: Safety  Goal: Will remain free from falls  Outcome: PROGRESSING AS EXPECTED  Pt educated to use call light before getting out of bed. Call light in reach. Bed locked in lowest position with 2 upper bed rails up. Pt encouraged to sit at edge of bed before standing up. No c/o dizziness. Stand by assistance given to help pt to the bathroom and back to bed. Room floor is free from clutter. Treaded socks on pt.       Problem: Knowledge Deficit  Goal: Knowledge of disease process/condition, treatment plan, diagnostic tests, and medications will improve    Intervention: Explain information regarding disease process/condition, treatment plan, diagnostic tests, and medications and document in education  Pt educated on plan of care, medications, pain management, and disease processes. Pt verbalized understanding and was encouraged to ask questions. All questions answered.

## 2018-12-01 NOTE — PROGRESS NOTES
Pt returned from cath lab with cath lab RN Bryan. Bedside report received and site visualized, CDI, no hematoma noted. Respirations even and unlabored, placed onto 4L oxymask, VSS, reconnected to tele monitor. Wife at bedside, updated on pt status. Awaiting transfer to inpatient unit

## 2018-12-01 NOTE — PROGRESS NOTES
Discharge instructions given and discussed, signed copy in chart. Pt verbalized understanding and all questions answered. All prescriptions reviewed. Patient is Alert & Oriented and discharged home in stable condition on room air via wheelchair escorted by staff and family. Personal belongings with patient. IV removed and tolerated well. Tele box removed, monitor room notified.

## 2018-12-01 NOTE — PROGRESS NOTES
Bedside report received from night nurse. Assumed care of pt. Pt awake, laying in bed. A/Ox4, VSS. No complaints at this time. POC reviewed and white board updated. Tele box on. Call light in reach. Bed locked in lowest position with 2 upper bed rails up. Right groin cath site CDI without any signs of bleeding.

## 2018-12-01 NOTE — PROGRESS NOTES
Bedside report received from Aisha CDU nurse. Assumed care of pt. Pt awake, laying in bed. A/Ox4, VSS. No complaints at this time. Pt oriented to unit. Educated on fall precautions and bedrest orders. Post-op vitals in place. POC reviewed and white board updated. Tele box on. Call light in reach. Bed locked in lowest position with 2 upper bed rails up. Right groin cath site is CDI without any signs of bleeding. Integrilin running at 11.3ml/hour.

## 2018-12-01 NOTE — PROGRESS NOTES
Pt transferred to T703-1 with transport and this RN on tele monitor. Pt states all belongings in possession, chart given to receiving RN. Wife at bedside.

## 2018-12-01 NOTE — DISCHARGE PLANNING
Anticipated Discharge Disposition: Home    Action: Met with patient.  Rx for effient ( generic name prasuguel ) sent to [facility].  Called [facility] and spoke with Marichuy in the Pharmacy [phone] and she ran the Rx and said it was covered, no prior auth needed.  Patient and bedside RN aware.     Barriers to Discharge: medical clearance    Plan: Home

## 2018-12-01 NOTE — DISCHARGE INSTRUCTIONS
Discharge Instructions    Discharged to home by car with relative. Discharged via wheelchair, hospital escort: Yes.  Special equipment needed: Not Applicable    Be sure to schedule a follow-up appointment with your primary care doctor or any specialists as instructed.     Discharge Plan:   Diet Plan: Discussed  Activity Level: Discussed  Confirmed Follow up Appointment: Appointment Scheduled  Confirmed Symptoms Management: Discussed  Medication Reconciliation Updated: Yes  Influenza Vaccine Indication: Not indicated: Previously immunized this influenza season and > 8 years of age    I understand that a diet low in cholesterol, fat, and sodium is recommended for good health. Unless I have been given specific instructions below for another diet, I accept this instruction as my diet prescription.   Other diet: cardiac, heart healthy    Special Instructions: Diagnosis:  Acute Coronary Syndrome (ACS) is a diagnosis that encompasses cardiac-related chest pain and heart attack. ACS occurs when the blood flow to the heart muscle is severely reduced or cut off completely due to a slow process called atherosclerosis.  Atherosclerosis is a disease in which the coronary arteries become narrow from a buildup of fat, cholesterol, and other substances that combine to form plaque. If the plaque breaks, a blood clot will form and block the blood flow to the heart muscle. This lack of blood flow can cause damage or death to the heart muscle which is called a heart attack or Myocardial Infarction (MI). There are two different types of MIs:  ST Elevation Myocardial Infarction or STEMI (the most severe type of heart attack) and Non-ST Elevation Myocardial Infarction or NSTEMI.    Treatment Plan:  · Cardiac Diet  - Low fat, low salt, low cholesterol   · Cardiac Rehab  - Your doctor has ordered you a referral to [facility].  Call [phone] to schedule an appointment.  · Attend my follow-up appointment with my Cardiologist.  · Take my  medications as prescribed by my doctor  · Exercise daily  · Lower my bad cholesterol and raise my good cholesterol    Medications:  Certain medications are used to treat ACS.  Remember to always take medications as prescribed and never stop talking medications unless told by your doctor.    You have been prescribed the following medicatons:    Anti-platelet/blood thinner - Your Anti-platelet/Blood thinning medication is called Effient, and is used in combination with aspirin to prevent clots from forming in your heart and/or around your stent.  Your doctor will determine how long you need to be on this medicine.    Statin Crestor is used to lower cholesterol.    · Is patient discharged on Warfarin / Coumadin?   No     Rosuvastatin Tablets  What is this medicine?  ROSUVASTATIN (karrie REGINO va sta tin) is known as a HMG-CoA reductase inhibitor or 'statin'. It lowers cholesterol and triglycerides in the blood. This drug may also reduce the risk of heart attack, stroke, or other health problems in patients with risk factors for heart disease. Diet and lifestyle changes are often used with this drug.  This medicine may be used for other purposes; ask your health care provider or pharmacist if you have questions.  COMMON BRAND NAME(S): Crestor  What should I tell my health care provider before I take this medicine?  They need to know if you have any of these conditions:  -frequently drink alcoholic beverages  -kidney disease  -liver disease  -muscle aches or weakness  -other medical condition  -an unusual or allergic reaction to rosuvastatin, other medicines, foods, dyes, or preservatives  -pregnant or trying to get pregnant  -breast-feeding  How should I use this medicine?  Take this medicine by mouth with a glass of water. Follow the directions on the prescription label. Do not cut, crush or chew this medicine. You can take this medicine with or without food. Take your doses at regular intervals. Do not take your medicine  more often than directed.  Talk to your pediatrician regarding the use of this medicine in children. While this drug may be prescribed for children as young as 7 years old for selected conditions, precautions do apply.  Overdosage: If you think you have taken too much of this medicine contact a poison control center or emergency room at once.  NOTE: This medicine is only for you. Do not share this medicine with others.  What if I miss a dose?  If you miss a dose, take it as soon as you can. Do not take 2 doses within 12 hours of each other. If there are less than 12 hours until your next dose, take only that dose. Do not take double or extra doses.  What may interact with this medicine?  Do not take this medicine with any of the following medications:  -herbal medicines like red yeast rice  This medicine may also interact with the following medications:  -alcohol  -antacids containing aluminum hydroxide or magnesium hydroxide  -cyclosporine  -other medicines for high cholesterol  -some medicines for HIV infection  -warfarin  This list may not describe all possible interactions. Give your health care provider a list of all the medicines, herbs, non-prescription drugs, or dietary supplements you use. Also tell them if you smoke, drink alcohol, or use illegal drugs. Some items may interact with your medicine.  What should I watch for while using this medicine?  Visit your doctor or health care professional for regular check-ups. You may need regular tests to make sure your liver is working properly.  Tell your doctor or health care professional right away if you get any unexplained muscle pain, tenderness, or weakness, especially if you also have a fever and tiredness. Your doctor or health care professional may tell you to stop taking this medicine if you develop muscle problems. If your muscle problems do not go away after stopping this medicine, contact your health care professional.  This medicine may affect blood  sugar levels. If you have diabetes, check with your doctor or health care professional before you change your diet or the dose of your diabetic medicine.  Avoid taking antacids containing aluminum, calcium or magnesium within 2 hours of taking this medicine.  This drug is only part of a total heart-health program. Your doctor or a dietician can suggest a low-cholesterol and low-fat diet to help. Avoid alcohol and smoking, and keep a proper exercise schedule.  Do not use this drug if you are pregnant or breast-feeding. Serious side effects to an unborn child or to an infant are possible. Talk to your doctor or pharmacist for more information.  What side effects may I notice from receiving this medicine?  Side effects that you should report to your doctor or health care professional as soon as possible:  -allergic reactions like skin rash, itching or hives, swelling of the face, lips, or tongue  -dark urine  -fever  -joint pain  -muscle cramps, pain  -redness, blistering, peeling or loosening of the skin, including inside the mouth  -trouble passing urine or change in the amount of urine  -unusually weak or tired  -yellowing of the eyes or skin  Side effects that usually do not require medical attention (report to your doctor or health care professional if they continue or are bothersome):  -constipation  -heartburn  -nausea  -stomach gas, pain, upset  This list may not describe all possible side effects. Call your doctor for medical advice about side effects. You may report side effects to FDA at 2-101-FDA-6914.  Where should I keep my medicine?  Keep out of the reach of children.  Store at room temperature between 20 and 25 degrees C (68 and 77 degrees F). Keep container tightly closed (protect from moisture). Throw away any unused medicine after the expiration date.  NOTE: This sheet is a summary. It may not cover all possible information. If you have questions about this medicine, talk to your doctor, pharmacist,  or health care provider.  © 2018 [employer]/Gold Standard (2016-06-02 13:33:08)      Prasugrel oral tablets  What is this medicine?  PRASUGREL (PRA arsh grel) helps to prevent blood clots. This medicine is used to prevent heart attack, stroke, or other vascular events in people who are at high risk.  This medicine may be used for other purposes; ask your health care provider or pharmacist if you have questions.  COMMON BRAND NAME(S): Effient  What should I tell my health care provider before I take this medicine?  They need to know if you have any of these conditions:  -bleeding disorders  -kidney disease  -liver disease  -recent trauma or surgery  -stomach or intestinal ulcers  -stroke or transient ischemic attack  -an unusual or allergic reaction to prasugrel, other medicines, foods, dyes, or preservatives  -pregnant or trying to get pregnant  -breast-feeding  How should I use this medicine?  Take this medicine by mouth with a drink of water. Follow the directions on the prescription label. You may take this medicine with or without food. If it upsets your stomach, take it with food. This medicine may be chewed or it may be crushed and put into food or liquids such as applesauce, juice, or water as long as it is taken immediately. This medicine has a bitter taste that you may notice if it is chewed or crushed. Take your medicine at regular intervals. Do not take your medicine more often than directed. Do not stop taking except on your doctor's advice.  Talk to your pediatrician regarding the use of this medicine in children. Special care may be needed.  Overdosage: If you think you have taken too much of this medicine contact a poison control center or emergency room at once.  NOTE: This medicine is only for you. Do not share this medicine with others.  What if I miss a dose?  If you miss a dose, take it as soon as you can. If it is almost time for your next dose, take only that dose. Do not take double or extra  doses.  What may interact with this medicine?  -aspirin  -certain medicines that treat or prevent blood clots like warfarin, enoxaparin, and dalteparin  -NSAIDS, medicines for pain and inflammation, like ibuprofen or naproxen  This list may not describe all possible interactions. Give your health care provider a list of all the medicines, herbs, non-prescription drugs, or dietary supplements you use. Also tell them if you smoke, drink alcohol, or use illegal drugs. Some items may interact with your medicine.  What should I watch for while using this medicine?  Visit your doctor or health care professional for regular check ups. Do not stop taking your medicine unless your doctor tells you to.  Notify your doctor or health care professional and seek emergency treatment if you develop breathing problems; changes in vision; chest pain; severe, sudden headache; pain, swelling, warmth in the leg; trouble speaking; sudden numbness or weakness of the face, arm, or leg. These can be signs that your condition has gotten worse.  If you are going to have surgery or dental work, tell your doctor or health care professional that you are taking this medicine.  What side effects may I notice from receiving this medicine?  Side effects that you should report to your doctor or health care professional as soon as possible:  -allergic reactions like skin rash, itching or hives, swelling of the face, lips, or tongue  -signs and symptoms of bleeding such as bloody or black, tarry stools; red or dark-brown urine; spitting up blood or brown material that looks like coffee grounds; red spots on the skin; unusual bruising or bleeding from the eye, gums, or nose  Side effects that usually do not require medical attention (report to your doctor or health care professional if they continue or are bothersome):  -diarrhea  -headache  -nausea, vomiting  -pain in back, arms or legs  This list may not describe all possible side effects. Call your  doctor for medical advice about side effects. You may report side effects to FDA at 6-239-FPV-2106.  Where should I keep my medicine?  Keep out of the reach of children.  Store at room temperature between 15 and 30 degrees C (59 and 86 degrees F). Keep this medicine in the original container. Keep container closed and do not remove the gray cylinder from the bottle. Throw away any unused medicine after the expiration date.  NOTE: This sheet is a summary. It may not cover all possible information. If you have questions about this medicine, talk to your doctor, pharmacist, or health care provider.  © 2018 [employer]vier/Gold Standard (2016-01-27 10:14:24)      Acute Coronary Syndrome Patient Discharge Instructions  Acute coronary syndrome (ACS) is an urgent problem in which the blood and oxygen supply to the heart is critically deficient. ACS requires hospitalization because one or more coronary arteries may be blocked.     ACS represents a range of conditions including:  · Previous angina that is now unstable, lasts longer, happens at rest or is more intense.  · A heart attack, with heart muscle cell injury and death.     There are three vital coronary arteries that supply the heart muscle with blood and oxygen so that it can pump blood effectively. If blockages to these arteries develop, blood flow to the heart muscle is reduced. If the heart does not get enough blood, angina may occur as the first warning sign    Symptoms:  · The most common signs of angina include:  · Tightness or squeezing in the chest.  · Feeling of heaviness on the chest.  · Discomfort in the arms, neck or jaw.  · Shortness of breath and nausea.  · Cold, wet skin.  · Angina is usually brought on by physical effort or excitement which increase the oxygen needs of the heart . These states increase the blood flow needs of the heart beyond what can be delivered.    Treatment   · Medicines to help discomfort may include nitroglycerin (nitro) in the form  of tablets or a spray for rapid relief, or longer acting forms such as cream, patches or capsules. (Be aware that there are many side effects and possible interactions with other drugs )  · Other medicines may be used to help the heart pump better.  · Procedures to open blocked arteries including angioplasty or stent placement to keep the arteries open.  · Open heart surgery may be needed when there are many blockages or they are in critical locations that are best treated with surgery.    Home Care Instructions  · Avoid smoking.  · Take one baby or adult aspirin daily if your caregiver advises to help reduces the risk of a heart attack.   · It is very important that you follow the angina treatment prescribed by your caregiver. Make arrangements for proper follow-up care.  · Eat properly and exercise. Your caregivers can help you with this.   · You should tell your doctor right away about any increase in the severity or frequency of your chest discomfort or angina attacks. When you have angina, you should stop what you are doing and sit down. This may bring relief in 3-5 minutes. If your doctor has prescribed nitro, take it as directed.   · If your caregiver has given you a follow-up appointment, it is very important to keep that appointment. Not keeping the appointment could result in a chronic or permanent injury, pain, and disability. If there is any problem keeping the appointment, you must call back to this facility for assistance.      SEEK IMMEDIATE MEDICAL CARE IF:   · You develop nausea, vomiting or shortness of breath.  · You feel faint, light headed or pass out.  · Your chest discomfort gets worse.  · You are sweating or experience sudden profound fatigue.  · You do not get relief of your chest pain after 3 doses of nitro.  · Your discomfort lasts longer than 15 minutes.    Make sure you:    · Understand these instructions.   · Will watch your condition.  · Will get help right away if you are not doing  well or get worse.     Document Released: 12/18/2006  Document Re-Released: 11/30/2009  ExitCare® Patient Information ©2010 [employer].      Depression / Suicide Risk    As you are discharged from this [facility] facility, it is important to learn how to keep safe from harming yourself.    Recognize the warning signs:  · Abrupt changes in personality, positive or negative- including increase in energy   · Giving away possessions  · Change in eating patterns- significant weight changes-  positive or negative  · Change in sleeping patterns- unable to sleep or sleeping all the time   · Unwillingness or inability to communicate  · Depression  · Unusual sadness, discouragement and loneliness  · Talk of wanting to die  · Neglect of personal appearance   · Rebelliousness- reckless behavior  · Withdrawal from people/activities they love  · Confusion- inability to concentrate     If you or a loved one observes any of these behaviors or has concerns about self-harm, here's what you can do:  · Talk about it- your feelings and reasons for harming yourself  · Remove any means that you might use to hurt yourself (examples: pills, rope, extension cords, firearm)  · Get professional help from the community (Mental Health, Substance Abuse, psychological counseling)  · Do not be alone:Call your Safe Contact- someone whom you trust who will be there for you.  · Call your local CRISIS HOTLINE 557-0778 or 741-912-8278  · Call your local Children's Mobile Crisis Response Team [place] (478) 229-9903 or www.B[employer]ze  · Call the toll free National Suicide Prevention Hotlines   · National Suicide Prevention Lifeline 586-375-IQVJ (2360)  · National Hope Line Network 800-SUICIDE (858-7971)

## 2018-12-01 NOTE — PROGRESS NOTES
Report received at bedside, patient care assumed. Tele box on. Patient lying flat on bed rest, cath site clean/dry/soft, 2L NC, post op vitals in place, VSS, wife at bedside, updated on POC, no requests at this time. Fall precautions in place with bed in lowest position, treaded sock slippers on, and call light within reach.

## 2018-12-01 NOTE — DISCHARGE PLANNING
Upon utilization review, patient noted to be on the following medications that could potentially require prior authorization if prescribed at discharge: Effient.  If it is anticipated that patient will require these medications at discharge, beginning the prescription prior auth process in advance to anticipated discharge could assist in preventing delays when patient is medically cleared to be discharged from the hospital.

## 2018-12-01 NOTE — DISCHARGE SUMMARY
Discharge Summary    CHIEF COMPLAINT ON ADMISSION  Chief Complaint   Patient presents with   • Chest Pain     for the past 2 days/ pt explains it feels like breathing in cold air that comes and goes for about 30-45 sec/ pt describes pain to be mid sternal and non radiating       Reason for Admission  Sent By Urgent Care; Chest Pain     Admission Date  11/29/2018    CODE STATUS  Full Code    HPI & HOSPITAL COURSE  This is a 58 y.o. male here with chest pain for the last 2-3 days, presenting to the emergency department for further evaluation of this today.  He reports that his chest pain is substernal, he characterizes this as a sharp pain, 4 out of 10 in intensity nonradiating and associated mostly whenever he is exerting for example with a flight of stairs or exertional activity with subsequent shortness of breath and dyspnea on exertion without any palpitations, lower extremity edema, orthopnea or paroxysmal nocturnal dyspnea.  To the point that he is unable to have any exertional activity at this time because it causes significant chest pain.  He reports that he would be unable to run on a treadmill for a stress test because of the chest pain that he develops with exertion, he is very certain regarding this finding.  At times he reports that his chest pain is severe to the point as if your breathing chilled air.  Otherwise no palpitations.  Denies any personal history of coronary artery disease or congestive heart failure.  Never smoked.  No family history of coronary artery disease or congestive heart failure.  No personal history of DVT or pulmonary embolism.    Troponins were negative and ekg showed nsr with no acute ST changes.  CXR showed mild edema vs infiltrates although patient had no pulmonary sx's such as cough/sob.  Patient was seen by cardiology and underwent cardiac cath which showed:    Anomolous origin of RCA. 90% proximal LAD lesion. 90% mid OM1  Good LV function.  LAD stented with good  result.  Due to contrast and fluoro time used to find RCA origin, Will defer OM1 inervention.     He had echo which showed:  Left ventricular systolic function is normal.  Left ventricular ejection fraction is visually estimated to be 55%. Mild mitral regurgitation. Unable to estimate pulmonary artery pressure due to an inadequate   tricuspid regurgitant.    Patient did well post procedure and cleared for discharge by cardiology. He had mild leukocytosis post procedure but no s/s of infections.  This was thought to be stress induced and patient advised to f/u with pcp next week with repeat cbc.  He had h/o adverse reaction to lipitor and cardiologist recommended to try crestor at low dose and observe for tolerance.  Patient was agreeable.  A1C was 5.9 indicating borderline DM and this was discussed with patient, along with advice for diet and lifestyle changes.           Therefore, he is discharged in fair and stable condition to home with close outpatient follow-up.    The patient recovered much more quickly than anticipated on admission.    Discharge Date  12/1/18    FOLLOW UP ITEMS POST DISCHARGE  Pcp/cardiology next week    DISCHARGE DIAGNOSES  Active Problems:    Chest pain POA: Yes    Hypertension POA: Yes    Dyslipidemia POA: Yes    Tremor POA: Yes    Hyperglycemia POA: Yes  Resolved Problems:    * No resolved hospital problems. *      FOLLOW UP  Future Appointments  Date Time Provider Department Center   12/5/2018 8:20 AM Brant Wheeler M.D. [facility]   12/31/2018 3:40 PM VANIA Perez [facility] None     No follow-up provider specified.    MEDICATIONS ON DISCHARGE     Medication List      START taking these medications      Instructions   aspirin 81 MG EC tablet  Start taking on:  12/2/2018   Take 1 Tab by mouth every day.  Dose:  81 mg     metoprolol 25 MG Tabs  Commonly known as:  LOPRESSOR   Take 1 Tab by mouth 2 Times a Day.  Dose:  25 mg     nitroglycerin 0.4 MG Subl  Commonly known as:   NITROSTAT   Place 1 Tab under tongue as needed for Chest Pain.  Dose:  0.4 mg     prasugrel 10 MG Tabs  Start taking on:  12/2/2018  Commonly known as:  EFFIENT   Take 1 Tab by mouth every day.  Dose:  10 mg     rosuvastatin 5 MG Tabs  Commonly known as:  CRESTOR   Take 1 Tab by mouth every evening.  Dose:  5 mg        CONTINUE taking these medications      Instructions   fish oil 1000 MG Caps capsule   Take 1,000 mg by mouth every day.  Dose:  1000 mg            Allergies  Allergies   Allergen Reactions   • Lipitor [Atorvastatin]      Arm stiffness       DIET  Orders Placed This Encounter   Procedures   • Diet Order Cardiac     Standing Status:   Standing     Number of Occurrences:   1     Order Specific Question:   Diet:     Answer:   Cardiac [6]       ACTIVITY  As tolerated.  Weight bearing as tolerated    CONSULTATIONS  cardiology    PROCEDURES  See above    LABORATORY  Lab Results   Component Value Date    SODIUM 139 12/01/2018    POTASSIUM 3.9 12/01/2018    CHLORIDE 105 12/01/2018    CO2 23 12/01/2018    GLUCOSE 156 (H) 12/01/2018    BUN 18 12/01/2018    CREATININE 1.19 12/01/2018        Lab Results   Component Value Date    WBC 13.8 (H) 12/01/2018    HEMOGLOBIN 15.6 12/01/2018    HEMATOCRIT 46.2 12/01/2018    PLATELETCT 282 12/01/2018        Total time of the discharge process exceeds 32 minutes.

## 2018-12-13 ENCOUNTER — OFFICE VISIT (OUTPATIENT)
Dept: MEDICAL GROUP | Facility: MEDICAL CENTER | Age: 58
End: 2018-12-13
Payer: COMMERCIAL

## 2018-12-13 VITALS
WEIGHT: 159 LBS | OXYGEN SATURATION: 95 % | BODY MASS INDEX: 22.76 KG/M2 | SYSTOLIC BLOOD PRESSURE: 116 MMHG | DIASTOLIC BLOOD PRESSURE: 72 MMHG | HEIGHT: 70 IN | HEART RATE: 85 BPM

## 2018-12-13 DIAGNOSIS — I25.83 CORONARY ARTERY DISEASE DUE TO LIPID RICH PLAQUE: ICD-10-CM

## 2018-12-13 DIAGNOSIS — I25.10 CORONARY ARTERY DISEASE DUE TO LIPID RICH PLAQUE: ICD-10-CM

## 2018-12-13 DIAGNOSIS — G20.A1 PARKINSON DISEASE: ICD-10-CM

## 2018-12-13 DIAGNOSIS — E78.5 DYSLIPIDEMIA: ICD-10-CM

## 2018-12-13 DIAGNOSIS — R73.02 IGT (IMPAIRED GLUCOSE TOLERANCE): ICD-10-CM

## 2018-12-13 PROBLEM — R07.9 CHEST PAIN: Status: RESOLVED | Noted: 2018-11-29 | Resolved: 2018-12-13

## 2018-12-13 PROBLEM — I10 HYPERTENSION: Status: RESOLVED | Noted: 2018-11-30 | Resolved: 2018-12-13

## 2018-12-13 PROBLEM — R73.9 HYPERGLYCEMIA: Status: RESOLVED | Noted: 2018-11-29 | Resolved: 2018-12-13

## 2018-12-13 PROCEDURE — 99214 OFFICE O/P EST MOD 30 MIN: CPT | Performed by: INTERNAL MEDICINE

## 2018-12-13 RX ORDER — ROSUVASTATIN CALCIUM 5 MG/1
5 TABLET, COATED ORAL EVERY EVENING
Qty: 90 TAB | Refills: 3 | Status: SHIPPED | OUTPATIENT
Start: 2018-12-13 | End: 2018-12-13 | Stop reason: SDUPTHER

## 2018-12-13 NOTE — PROGRESS NOTES
CC: Follow-up hospitalization coronary artery disease.      HPI:   Pantera presents today with the following.    1. Coronary artery disease due to lipid rich plaque  Presents after going to the emergency room for some chest irregularities.  Troponins and EKG normal end up getting cast showing 2 vessels with 90% occlusion.  He was started on cholesterol medication received a stent to the LAD and is currently on antiplatelet therapy.  Currently doing well denying any return of symptoms.  He does have a cough follow-up with cardiology    2. Dyslipidemia  Never had significant elevation of cholesterol has been on other statins in the past that he did not tolerate.    3. IGT (impaired glucose tolerance)  Blood sugar is marginally elevated never been in diabetic range.    4. Parkinson disease (HCC)  He has been diagnosed with Parkinson's currently not treating.      Patient Active Problem List    Diagnosis Date Noted   • Tremor 02/10/2017     Priority: Low   • Dyslipidemia 10/06/2015     Priority: Low   • Coronary artery disease due to lipid rich plaque 12/13/2018   • Parkinson disease (HCC) 12/13/2018   • Family history of colon cancer 02/10/2017   • IGT (impaired glucose tolerance) 05/20/2016   • Vitamin D deficiency 05/20/2016   • Diverticulosis of large intestine 10/06/2015       Current Outpatient Prescriptions   Medication Sig Dispense Refill   • metoprolol (LOPRESSOR) 25 MG Tab Take 1 Tab by mouth 2 Times a Day. 120 Tab 3   • rosuvastatin (CRESTOR) 5 MG Tab Take 1 Tab by mouth every evening. 90 Tab 3   • prasugrel (EFFIENT) 10 MG Tab Take 1 Tab by mouth every day. 90 Tab 6   • aspirin EC 81 MG EC tablet Take 1 Tab by mouth every day. 100 Tab 0   • nitroglycerin (NITROSTAT) 0.4 MG SL Tab Place 1 Tab under tongue as needed for Chest Pain. 25 Tab 0     No current facility-administered medications for this visit.          Allergies as of 12/13/2018 - Reviewed 12/13/2018   Allergen Reaction Noted   • Lipitor  "[atorvastatin]  02/10/2017        ROS: Denies Chest pain, SOB, LE edema.    /72 (BP Location: Right arm, Patient Position: Sitting)   Pulse 85   Ht 1.778 m (5' 10\")   Wt 72.1 kg (159 lb)   SpO2 95%   BMI 22.81 kg/m²     Physical Exam:  Gen:         Alert and oriented, No apparent distress.  Neck:        No Lymphadenopathy or Bruits.  Lungs:     Clear to auscultation bilaterally  CV:          Regular rate and rhythm. No murmurs, rubs or gallops.               Ext:          No clubbing, cyanosis, edema.      Assessment and Plan.   58 y.o. male with the following issues.    1. Coronary artery disease due to lipid rich plaque  Refilled medication continue risk reduction follow along with cardiology.  May need a second stent to RCA.  - metoprolol (LOPRESSOR) 25 MG Tab; Take 1 Tab by mouth 2 Times a Day.  Dispense: 120 Tab; Refill: 3    2. Dyslipidemia  Continue Crestor will try and push dose higher with time.  - rosuvastatin (CRESTOR) 5 MG Tab; Take 1 Tab by mouth every evening.  Dispense: 90 Tab; Refill: 3    3. IGT (impaired glucose tolerance)  Continue to monitor    4. Parkinson disease (HCC)  Follow along with specialist      "

## 2018-12-31 ENCOUNTER — OFFICE VISIT (OUTPATIENT)
Dept: CARDIOLOGY | Facility: MEDICAL CENTER | Age: 58
End: 2018-12-31
Payer: COMMERCIAL

## 2018-12-31 VITALS
OXYGEN SATURATION: 95 % | BODY MASS INDEX: 22.48 KG/M2 | HEIGHT: 70 IN | DIASTOLIC BLOOD PRESSURE: 88 MMHG | SYSTOLIC BLOOD PRESSURE: 138 MMHG | WEIGHT: 157 LBS | HEART RATE: 82 BPM

## 2018-12-31 DIAGNOSIS — G20.A1 PARKINSON DISEASE: ICD-10-CM

## 2018-12-31 DIAGNOSIS — I25.83 CORONARY ARTERY DISEASE DUE TO LIPID RICH PLAQUE: ICD-10-CM

## 2018-12-31 DIAGNOSIS — Z95.5 STATUS POST INSERTION OF DRUG ELUTING CORONARY ARTERY STENT: ICD-10-CM

## 2018-12-31 DIAGNOSIS — I25.10 CORONARY ARTERY DISEASE DUE TO LIPID RICH PLAQUE: ICD-10-CM

## 2018-12-31 DIAGNOSIS — E78.5 DYSLIPIDEMIA: ICD-10-CM

## 2018-12-31 PROCEDURE — 99214 OFFICE O/P EST MOD 30 MIN: CPT | Performed by: NURSE PRACTITIONER

## 2018-12-31 RX ORDER — PRASUGREL 10 MG/1
10 TABLET, FILM COATED ORAL DAILY
Qty: 90 TAB | Refills: 6 | Status: SHIPPED | OUTPATIENT
Start: 2018-12-31 | End: 2020-01-21 | Stop reason: SDUPTHER

## 2018-12-31 ASSESSMENT — ENCOUNTER SYMPTOMS
TREMORS: 1
PND: 0
ABDOMINAL PAIN: 0
DIZZINESS: 0
MYALGIAS: 0
WEAKNESS: 0
COUGH: 0
ORTHOPNEA: 0
CLAUDICATION: 0
SHORTNESS OF BREATH: 0
PALPITATIONS: 0

## 2018-12-31 NOTE — PROGRESS NOTES
Chief Complaint   Patient presents with   • Coronary Artery Disease     [facility]       Subjective:   Pantera Parker is a 58 y.o. male who presents today with his wife, Inessa, for hospital follow-up.  He was having a sensation of cold air in his chest with exertion that would go away with rest.  He was taken to the Cath Lab to evaluate him even though his troponins were negative.  He was found to have a 90% stenosis of the LAD which was successfully stented.  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  He has a 90% obtuse marginal stenosis that was not intervened due to too much Cath Lab time.  He is to be scheduled for intervention in the future.    Since being out of the hospital, he has not had any more of the sensation of breathing cold air in his chest.  He has remained mildly active but has done activity that would have triggered the symptom previously.    He feels generally well with the exception of tremor in his right hand from Parkinson's.  For this reason his groin site was used for the cardiac catheterization.  He is left-handed.    Past Medical History:   Diagnosis Date   • CAD (coronary artery disease)      Past Surgical History:   Procedure Laterality Date   • CARDIAC CATH  11/30/2018    Stent to 90% LAD,has 90% OM1 that needs intervention.   • HERNIA REPAIR Bilateral     age 24 months   • VASECTOMY Bilateral     1997     Family History   Problem Relation Age of Onset   • GI Mother    • Cancer Father      Social History     Social History   • Marital status:      Spouse name: N/A   • Number of children: N/A   • Years of education: N/A     Occupational History   • Not on file.     Social History Main Topics   • Smoking status: Never Smoker   • Smokeless tobacco: Never Used   • Alcohol use 0.6 oz/week     1 Shots of liquor per week   • Drug use: No   • Sexual activity: Yes     Partners: Female      "Birth control/ protection: Surgical     Other Topics Concern   • Not on file     Social History Narrative   • No narrative on file     Allergies   Allergen Reactions   • Lipitor [Atorvastatin]      Arm stiffness     Outpatient Encounter Prescriptions as of 12/31/2018   Medication Sig Dispense Refill   • metoprolol (LOPRESSOR) 25 MG Tab Take 1 Tab by mouth 2 Times a Day. 180 Tab 3   • prasugrel (EFFIENT) 10 MG Tab Take 1 Tab by mouth every day. 90 Tab 6   • rosuvastatin (CRESTOR) 5 MG Tab Take 1 Tab by mouth every evening. 90 Tab 3   • aspirin EC 81 MG EC tablet Take 1 Tab by mouth every day. 100 Tab 0   • [DISCONTINUED] metoprolol (LOPRESSOR) 25 MG Tab Take 1 Tab by mouth 2 Times a Day. 120 Tab 3   • nitroglycerin (NITROSTAT) 0.4 MG SL Tab Place 1 Tab under tongue as needed for Chest Pain. 25 Tab 0   • [DISCONTINUED] prasugrel (EFFIENT) 10 MG Tab Take 1 Tab by mouth every day. 90 Tab 6     No facility-administered encounter medications on file as of 12/31/2018.      Review of Systems   Constitutional: Negative for malaise/fatigue.   Respiratory: Negative for cough and shortness of breath.    Cardiovascular: Negative for chest pain, palpitations, orthopnea, claudication, leg swelling and PND.   Gastrointestinal: Negative for abdominal pain.   Musculoskeletal: Negative for myalgias.   Neurological: Positive for tremors (Right hand from Parkinson's.). Negative for dizziness and weakness.        Objective:   /88 (BP Location: Left arm, Patient Position: Sitting, BP Cuff Size: Adult)   Pulse 82   Ht 1.778 m (5' 10\")   Wt 71.2 kg (157 lb)   SpO2 95%   BMI 22.53 kg/m²     Physical Exam   Constitutional: He is oriented to person, place, and time. He appears well-developed and well-nourished.   HENT:   Head: Normocephalic.   Eyes: EOM are normal.   Neck: Normal range of motion. No JVD present.   Cardiovascular: Normal rate and regular rhythm.  Exam reveals no friction rub.    No murmur heard.  Pulmonary/Chest: " Effort normal.   Abdominal: Soft. Bowel sounds are normal.   Musculoskeletal: He exhibits no edema.   Neurological: He is alert and oriented to person, place, and time. He displays tremor (Right hand tremor.).   Skin: Skin is warm and dry.   Psychiatric: He has a normal mood and affect.     November 29, 2018: Cardiac catheterization:   Anomolous origin of RCA. 90% proximal LAD lesion. 90% mid OM1  Good LV function.  LAD stented with good result.  Due to contrast and fluoro time used to find RCA origin, Will defer OM1 inervention.       November 30, 2018: Transthoracic Echo Report  No prior study is available for comparison.   Left ventricular systolic function is normal.  Left ventricular ejection fraction is visually estimated to be 55%.  Mild mitral regurgitation.  Unable to estimate pulmonary artery pressure due to an inadequate tricuspid regurgitant jet.      Results for LIZBETH MORENO (MRN [id number])   Ref. Range 11/30/2018 01:23   Sodium Latest Ref Range: 135 - 145 mmol/L 141   Potassium Latest Ref Range: 3.6 - 5.5 mmol/L 3.6   Chloride Latest Ref Range: 96 - 112 mmol/L 110   Co2 Latest Ref Range: 20 - 33 mmol/L 22   Anion Gap Latest Ref Range: 0.0 - 11.9  9.0   Glucose Latest Ref Range: 65 - 99 mg/dL 111 (H)   Bun Latest Ref Range: 8 - 22 mg/dL 15   Creatinine Latest Ref Range: 0.50 - 1.40 mg/dL 0.92   GFR If  Latest Ref Range: >60 mL/min/1.73 m 2 >60   GFR If Non  Latest Ref Range: >60 mL/min/1.73 m 2 >60   Calcium Latest Ref Range: 8.5 - 10.5 mg/dL 8.5   AST(SGOT) Latest Ref Range: 12 - 45 U/L 16   ALT(SGPT) Latest Ref Range: 2 - 50 U/L 15   Alkaline Phosphatase Latest Ref Range: 30 - 99 U/L 68   Total Bilirubin Latest Ref Range: 0.1 - 1.5 mg/dL 0.7   Albumin Latest Ref Range: 3.2 - 4.9 g/dL 3.8   Total Protein Latest Ref Range: 6.0 - 8.2 g/dL 5.9 (L)   Globulin Latest Ref Range: 1.9 - 3.5 g/dL 2.1   A-G Ratio Latest Units: g/dL 1.8   Glycohemoglobin Latest Ref Range: 0.0 -  5.6 % 5.9 (H)   Estim. Avg Glu Latest Units: mg/dL 123   Cholesterol,Tot Latest Ref Range: 100 - 199 mg/dL 182   Triglycerides Latest Ref Range: 0 - 149 mg/dL 158 (H)   HDL Latest Ref Range: >=40 mg/dL 36 (A)   LDL Latest Ref Range: <100 mg/dL 114 (H)       Assessment:     1. Coronary artery disease due to lipid rich plaque  metoprolol (LOPRESSOR) 25 MG Tab    prasugrel (EFFIENT) 10 MG Tab   2. Status post insertion of drug eluting coronary artery stent     3. Dyslipidemia     4. Parkinson disease (HCC)         Medical Decision Making:  Today's Assessment / Status / Plan:   Coronary artery disease: He had normal troponins and no damage to his myocardium.  He is status post ALBERT to the LAD.  Patient needs intervention on the OM1 which was not completed due to long cath time.    I spoke with Dr Maciel who agrees the patient needs intervention to his OM1.  We will schedule the patient for Monday, January 7 for cardiac catheterization.  Patient will continue to take his current medications.  If he develops his anginal symptoms prior to this time he will let us know.    Dyslipidemia: His LDL goal now is a 70 or less.  He has lab ordered by his primary care and will do this after 8 weeks of being on a statin.  I expect that he may need a higher dose to get his LDL below 70.    Parkinson's disease: He has tremor in his right hand which precludes us from using his right radial artery.  Possibly they can use his left or the groin site again.    Patient will follow-up as scheduled after his intervention.    Collaborating Provider: Dr Maciel.

## 2019-01-02 ENCOUNTER — TELEPHONE (OUTPATIENT)
Dept: CARDIOLOGY | Facility: MEDICAL CENTER | Age: 59
End: 2019-01-02

## 2019-01-07 ENCOUNTER — APPOINTMENT (OUTPATIENT)
Dept: RADIOLOGY | Facility: MEDICAL CENTER | Age: 59
End: 2019-01-07
Attending: INTERNAL MEDICINE
Payer: COMMERCIAL

## 2019-01-07 ENCOUNTER — HOSPITAL ENCOUNTER (OUTPATIENT)
Facility: MEDICAL CENTER | Age: 59
End: 2019-01-07
Attending: INTERNAL MEDICINE | Admitting: INTERNAL MEDICINE
Payer: COMMERCIAL

## 2019-01-07 VITALS
DIASTOLIC BLOOD PRESSURE: 98 MMHG | HEART RATE: 80 BPM | OXYGEN SATURATION: 96 % | TEMPERATURE: 97.2 F | BODY MASS INDEX: 22.03 KG/M2 | SYSTOLIC BLOOD PRESSURE: 131 MMHG | HEIGHT: 70 IN | WEIGHT: 153.88 LBS | RESPIRATION RATE: 17 BRPM

## 2019-01-07 LAB
ALBUMIN SERPL BCP-MCNC: 4.7 G/DL (ref 3.2–4.9)
ALBUMIN/GLOB SERPL: 1.8 G/DL
ALP SERPL-CCNC: 83 U/L (ref 30–99)
ALT SERPL-CCNC: 21 U/L (ref 2–50)
ANION GAP SERPL CALC-SCNC: 11 MMOL/L (ref 0–11.9)
APTT PPP: 29.8 SEC (ref 24.7–36)
AST SERPL-CCNC: 16 U/L (ref 12–45)
BILIRUB SERPL-MCNC: 0.7 MG/DL (ref 0.1–1.5)
BUN SERPL-MCNC: 17 MG/DL (ref 8–22)
CALCIUM SERPL-MCNC: 9.9 MG/DL (ref 8.5–10.5)
CHLORIDE SERPL-SCNC: 105 MMOL/L (ref 96–112)
CO2 SERPL-SCNC: 26 MMOL/L (ref 20–33)
CREAT SERPL-MCNC: 0.94 MG/DL (ref 0.5–1.4)
EKG IMPRESSION: NORMAL
EKG IMPRESSION: NORMAL
ERYTHROCYTE [DISTWIDTH] IN BLOOD BY AUTOMATED COUNT: 40.7 FL (ref 35.9–50)
GLOBULIN SER CALC-MCNC: 2.6 G/DL (ref 1.9–3.5)
GLUCOSE SERPL-MCNC: 104 MG/DL (ref 65–99)
HCT VFR BLD AUTO: 51.5 % (ref 42–52)
HGB BLD-MCNC: 16.9 G/DL (ref 14–18)
INR PPP: 1.03 (ref 0.87–1.13)
MCH RBC QN AUTO: 30.1 PG (ref 27–33)
MCHC RBC AUTO-ENTMCNC: 32.8 G/DL (ref 33.7–35.3)
MCV RBC AUTO: 91.6 FL (ref 81.4–97.8)
PLATELET # BLD AUTO: 324 K/UL (ref 164–446)
PMV BLD AUTO: 9.5 FL (ref 9–12.9)
POTASSIUM SERPL-SCNC: 4.1 MMOL/L (ref 3.6–5.5)
PROT SERPL-MCNC: 7.3 G/DL (ref 6–8.2)
PROTHROMBIN TIME: 13.6 SEC (ref 12–14.6)
RBC # BLD AUTO: 5.62 M/UL (ref 4.7–6.1)
SODIUM SERPL-SCNC: 142 MMOL/L (ref 135–145)
WBC # BLD AUTO: 8.6 K/UL (ref 4.8–10.8)

## 2019-01-07 PROCEDURE — 85610 PROTHROMBIN TIME: CPT

## 2019-01-07 PROCEDURE — 71046 X-RAY EXAM CHEST 2 VIEWS: CPT

## 2019-01-07 PROCEDURE — 80053 COMPREHEN METABOLIC PANEL: CPT

## 2019-01-07 PROCEDURE — 93010 ELECTROCARDIOGRAM REPORT: CPT | Mod: 76 | Performed by: INTERNAL MEDICINE

## 2019-01-07 PROCEDURE — 93005 ELECTROCARDIOGRAM TRACING: CPT | Performed by: INTERNAL MEDICINE

## 2019-01-07 PROCEDURE — 304952 HCHG R 2 PADS

## 2019-01-07 PROCEDURE — C1769 GUIDE WIRE: HCPCS

## 2019-01-07 PROCEDURE — C1894 INTRO/SHEATH, NON-LASER: HCPCS

## 2019-01-07 PROCEDURE — 700117 HCHG RX CONTRAST REV CODE 255: Performed by: INTERNAL MEDICINE

## 2019-01-07 PROCEDURE — 99153 MOD SED SAME PHYS/QHP EA: CPT

## 2019-01-07 PROCEDURE — C1874 STENT, COATED/COV W/DEL SYS: HCPCS

## 2019-01-07 PROCEDURE — 85730 THROMBOPLASTIN TIME PARTIAL: CPT

## 2019-01-07 PROCEDURE — C9600 PERC DRUG-EL COR STENT SING: HCPCS | Mod: LC

## 2019-01-07 PROCEDURE — 92928 PRQ TCAT PLMT NTRAC ST 1 LES: CPT | Mod: LC | Performed by: INTERNAL MEDICINE

## 2019-01-07 PROCEDURE — 93454 CORONARY ARTERY ANGIO S&I: CPT

## 2019-01-07 PROCEDURE — C1725 CATH, TRANSLUMIN NON-LASER: HCPCS

## 2019-01-07 PROCEDURE — 700111 HCHG RX REV CODE 636 W/ 250 OVERRIDE (IP)

## 2019-01-07 PROCEDURE — 99152 MOD SED SAME PHYS/QHP 5/>YRS: CPT | Performed by: INTERNAL MEDICINE

## 2019-01-07 PROCEDURE — C1887 CATHETER, GUIDING: HCPCS

## 2019-01-07 PROCEDURE — 307093 HCHG TR BAND RADIAL

## 2019-01-07 PROCEDURE — 700101 HCHG RX REV CODE 250

## 2019-01-07 PROCEDURE — 99152 MOD SED SAME PHYS/QHP 5/>YRS: CPT

## 2019-01-07 PROCEDURE — 160002 HCHG RECOVERY MINUTES (STAT)

## 2019-01-07 PROCEDURE — 85027 COMPLETE CBC AUTOMATED: CPT

## 2019-01-07 RX ORDER — PRASUGREL 10 MG/1
10 TABLET, FILM COATED ORAL DAILY
Status: DISCONTINUED | OUTPATIENT
Start: 2019-01-08 | End: 2019-01-07 | Stop reason: HOSPADM

## 2019-01-07 RX ORDER — MIDAZOLAM HYDROCHLORIDE 1 MG/ML
INJECTION INTRAMUSCULAR; INTRAVENOUS
Status: COMPLETED
Start: 2019-01-07 | End: 2019-01-07

## 2019-01-07 RX ORDER — SODIUM CHLORIDE 9 MG/ML
INJECTION, SOLUTION INTRAVENOUS CONTINUOUS
Status: DISCONTINUED | OUTPATIENT
Start: 2019-01-07 | End: 2019-01-07 | Stop reason: HOSPADM

## 2019-01-07 RX ORDER — HEPARIN SODIUM,PORCINE 1000/ML
VIAL (ML) INJECTION
Status: COMPLETED
Start: 2019-01-07 | End: 2019-01-07

## 2019-01-07 RX ORDER — ACETAMINOPHEN 325 MG/1
650 TABLET ORAL EVERY 6 HOURS PRN
Status: DISCONTINUED | OUTPATIENT
Start: 2019-01-07 | End: 2019-01-07 | Stop reason: HOSPADM

## 2019-01-07 RX ORDER — VERAPAMIL HYDROCHLORIDE 2.5 MG/ML
INJECTION, SOLUTION INTRAVENOUS
Status: COMPLETED
Start: 2019-01-07 | End: 2019-01-07

## 2019-01-07 RX ORDER — BIVALIRUDIN 250 MG/5ML
INJECTION, POWDER, LYOPHILIZED, FOR SOLUTION INTRAVENOUS
Status: COMPLETED
Start: 2019-01-07 | End: 2019-01-07

## 2019-01-07 RX ORDER — LIDOCAINE HYDROCHLORIDE 20 MG/ML
INJECTION, SOLUTION INFILTRATION; PERINEURAL
Status: COMPLETED
Start: 2019-01-07 | End: 2019-01-07

## 2019-01-07 RX ADMIN — HEPARIN SODIUM: 1000 INJECTION, SOLUTION INTRAVENOUS; SUBCUTANEOUS at 10:19

## 2019-01-07 RX ADMIN — LIDOCAINE HYDROCHLORIDE: 20 INJECTION, SOLUTION INFILTRATION; PERINEURAL at 10:19

## 2019-01-07 RX ADMIN — FENTANYL CITRATE 100 MCG: 50 INJECTION, SOLUTION INTRAMUSCULAR; INTRAVENOUS at 10:39

## 2019-01-07 RX ADMIN — MIDAZOLAM HYDROCHLORIDE 2 MG: 1 INJECTION, SOLUTION INTRAMUSCULAR; INTRAVENOUS at 10:39

## 2019-01-07 RX ADMIN — VERAPAMIL HYDROCHLORIDE 2.5 MG: 2.5 INJECTION, SOLUTION INTRAVENOUS at 10:19

## 2019-01-07 RX ADMIN — HEPARIN SODIUM: 200 INJECTION, SOLUTION INTRAVENOUS at 09:45

## 2019-01-07 RX ADMIN — MIDAZOLAM HYDROCHLORIDE 2 MG: 1 INJECTION, SOLUTION INTRAMUSCULAR; INTRAVENOUS at 10:19

## 2019-01-07 RX ADMIN — NITROGLYCERIN 10 ML: 20 INJECTION INTRAVENOUS at 10:19

## 2019-01-07 RX ADMIN — IOHEXOL 104 ML: 350 INJECTION, SOLUTION INTRAVENOUS at 10:46

## 2019-01-07 RX ADMIN — BIVALIRUDIN 250 MG: 250 INJECTION, POWDER, LYOPHILIZED, FOR SOLUTION INTRAVENOUS at 10:39

## 2019-01-07 ASSESSMENT — PAIN SCALES - GENERAL
PAINLEVEL_OUTOF10: 0

## 2019-01-07 NOTE — OP REPORT
Cardiac catheterization report    Procedure date: 1/7/2019    Pre-operative Diagnosis:  Two vessel coronary artery disease with recent left anterior descending artery stent but residual 99% proximal first obtuse marginal branch stenosis     Post-operative Diagnosis:   Status post stenting of the first obtuse marginal branch the left circumflex artery with 2.25 x 12 mm Synergy drug-eluting stent with no residual stenosis and PADILLA-3 flow  Patent recently placed proximal left anterior descending stent    Procedure:  Percutaneous current dimension of the first obtuse marginal branch of left circumflex artery  Supervision of moderate conscious sedation    Complications: None    Description of Procedure:  After informed consent was obtained, the patient was brought to cardiac catheterization laboratory in fasting state.   Yuan test was carried out on the left hand and was found to be negative.  Left wrist and right groin were then prepped and drapped in sterile fashion.  Versed and fentanyl were used for conscious sedation.  Lidocaine 2% was used to anesthetize the area.  A 6 Fijian Terumo sheath was then placed in the right radial artery using Seldinger technique.  A 2.5 mg of verapamil, 100 microgram of nitroglycerine and 3000 units of heparin were administered into the radial sheath.  A 6 Fijian EBU 3.75 guide within advanced under fluoroscopic guidance and then seated into the left main.  Angiomax was then started for anticoagulation.  Guiding shots were then taken in two orthogonal views.  0.014 BMW wire was then advanced past the stenosis.  The lesion was dilated with 2x8 mm balloon.  A 2.25x12 mm Synergy mm drug eluting stent was deployed and post dilated with 2.25x8 mm non-compliance balloon upto 14 ATMs.  Subsequent angiography showed no significant residual stenosis with good flow.   The guide wire was then removed. The final angiography was then performed.  It confirmed good result.  The guiding catheter was  then removed. Radial sheath was then removed.  Hemostasis was obtained using Terumo TR wrist band.  The patient was already taking prasugrel, no additional loading dose was given.   Bivalirudin was reduced to low dose infusion.  The patient tolerated procedure well and left cardiac catheterization laboratory in stable condition.    I supervised monitoring the patient under moderate conscious sedation beginning at 10:10 AM until the end of the case at 10:44 AM.    Findings:    At the beginning of the procedure, the first obtuse marginal branch of the left circumflex artery had 99% proximal stenosis.  The previously placed stent in the proximal left anterior descending has no significant stenosis with normal flow    After intervention, there was 0% residual stenosis with PADILLA III flow in the first obtuse marginal branch.    Plan:   Continue dual antiplatelet therapy for one year.   Risk factor modification.  Limit right wrist movement for 24 hours.  Discharge home later this afternoon stable.  Follow-up in 1 week.        Jose Maciel M.D.

## 2019-01-07 NOTE — DISCHARGE INSTRUCTIONS
ACTIVITY: Rest and take it easy for the first 24 hours.  A responsible adult is recommended to remain with you during that time.  It is normal to feel sleepy.  We encourage you to not do anything that requires balance, judgment or coordination.    MILD FLU-LIKE SYMPTOMS ARE NORMAL. YOU MAY EXPERIENCE GENERALIZED MUSCLE ACHES, THROAT IRRITATION, HEADACHE AND/OR SOME NAUSEA.    FOR 24 HOURS DO NOT:  Drive, operate machinery or run household appliances.  Drink beer or alcoholic beverages.   Make important decisions or sign legal documents.    SPECIAL INSTRUCTIONS: follow up with primary care physician as needed  If you experience chest pain, shortness of breath call 911 return to ER   Resume your home medications  Follow up with your cardiologist    DIET: To avoid nausea, slowly advance diet as tolerated, avoiding spicy or greasy foods for the first day.  Add more substantial food to your diet according to your physician's instructions.  Babies can be fed formula or breast milk as soon as they are hungry.  INCREASE FLUIDS AND FIBER TO AVOID CONSTIPATION.    SURGICAL DRESSING/BATHING: keep dressing clean dry intact for 24 hours, remove dressing after 24 hours.    FOLLOW-UP APPOINTMENT:  A follow-up appointment should be arranged with your doctor in [phone]; call to schedule.    You should CALL YOUR PHYSICIAN if you develop:  Fever greater than 101 degrees F.  Pain not relieved by medication, or persistent nausea or vomiting.  Excessive bleeding (blood soaking through dressing) or unexpected drainage from the wound.  Extreme redness or swelling around the incision site, drainage of pus or foul smelling drainage.  Inability to urinate or empty your bladder within 8 hours.  Problems with breathing or chest pain.    You should call 911 if you develop problems with breathing or chest pain.  If you are unable to contact your doctor or surgical center, you should go to the nearest emergency room or urgent care center.   Physician's telephone #: [phone]    If any questions arise, call your doctor.  If your doctor is not available, please feel free to call the Surgical Center at [phone].  The Center is open Monday through Friday from 7AM to 7PM.  You can also call the HEALTH HOTLINE open 24 hours/day, 7 days/week and speak to a nurse at [phone], or toll free at [phone].    A registered nurse may call you a few days after your surgery to see how you are doing after your procedure.    MEDICATIONS: Resume taking daily medication.  Take prescribed pain medication with food.  If no medication is prescribed, you may take non-aspirin pain medication if needed.  PAIN MEDICATION CAN BE VERY CONSTIPATING.  Take a stool softener or laxative such as senokot, pericolace, or milk of magnesia if needed.    If your physician has prescribed pain medication that includes Acetaminophen (Tylenol), do not take additional Acetaminophen (Tylenol) while taking the prescribed medication.    Depression / Suicide Risk    As you are discharged from this [facility] facility, it is important to learn how to keep safe from harming yourself.    Recognize the warning signs:  · Abrupt changes in personality, positive or negative- including increase in energy   · Giving away possessions  · Change in eating patterns- significant weight changes-  positive or negative  · Change in sleeping patterns- unable to sleep or sleeping all the time   · Unwillingness or inability to communicate  · Depression  · Unusual sadness, discouragement and loneliness  Talk of wanting to dieAngiogram, Angioplasty, or Stent Placement  Care After  One of the following procedures was done today.  ANGIOGRAM:  A catheter was placed through the blood vessel in your groin, contrast was injected into the vessels, and pictures were taken.  ANGIOPLASTY:  A catheter was placed through the blood vessel in your groin and directed to an area of blocked blood flow. A balloon, and  possibly a metal stent were used to open the blockage. If no other blockages are present below this area, your symptoms should improve. If blockages are present below this area, surgery may still be necessary.  STENT:  A catheter was placed in your groin through which a metal mesh tube was placed in a narrowed part of the artery to facilitate blood flow.  You were given intravenous sedation. These medications are rapidly cleared from your bloodstream. You may feel some discomfort at the insertion site after the local anesthetic wears off. This discomfort should gradually improve over the next several days.  Only take over-the-counter or prescription medicines for pain, discomfort, or fever as directed by your caregiver.  Complications are very uncommon after this procedure. Go to the nearest emergency department if you develop any of the following symptoms:  Worsening pain.  Bleeding.  Swelling at the puncture site.  Lightheadedness.  Dizziness or fainting.  Fever or chills.  If oozing, bleeding, or a lump appears at the puncture site, apply firm pressure directly to the site steadily for 15 minutes and go to the emergency department.  Keep the skin around the insertion site dry. You may take showers after 24 hours. If the area does get wet, dry the skin completely. Avoid baths until the skin puncture site heals, usually 5 to 7 days.  Development of redness, increased soreness, or swelling may be signs of a skin infection. Contact your physician.  Rest for the remainder of the day and avoid any heavy lifting (more than 10 pounds or 4.5 kg). Do not operate heavy machinery, drive, or make legal decisions for the first 24 hours after the procedure. Have a responsible person drive you home.  You may resume your usual diet after the procedure. Avoid alcoholic beverages for 24 hours after the procedure.  Document Released: 12/18/2006 Document Revised: 03/11/2013 Document Reviewed: 10/17/2007  ExitCare® Patient Information  ©2014 [employer], [facility].  ·   · Neglect of personal appearance   · Rebelliousness- reckless behavior  · Withdrawal from people/activities they love  · Confusion- inability to concentrate     If you or a loved one observes any of these behaviors or has concerns about self-harm, here's what you can do:  · Talk about it- your feelings and reasons for harming yourself  · Remove any means that you might use to hurt yourself (examples: pills, rope, extension cords, firearm)  · Get professional help from the community (Mental Health, Substance Abuse, psychological counseling)  · Do not be alone:Call your Safe Contact- someone whom you trust who will be there for you.  · Call your local CRISIS HOTLINE 366-5587 or 354-916-7793  · Call your local Children's Mobile Crisis Response Team [place] (582) 136-8229 or www.[employer]  · Call the toll free National Suicide Prevention Hotlines   · National Suicide Prevention Lifeline 064-641-NBRZ (3912)  [facility] [employer] [employer] 800-SUICIDE (966-8163)Radial Catherization Discharge Instructions      · Do not subject hand/arm to any forceful movements for 24 hours    i.e. supporting weight when rising from the chair or bed.   · Do not drive a car for 24 hours  · You may remove the dressing tomorrow  · You may shower on the day following your procedure.  Do not take a tub bath or submerge the puncture site in water for 3 days following the procedure.  · No Lifting more than 3-5 pounds with affected wrist for 5 days  · Follow up with Dr. cheney  2-4 weeks.  · Increase fluids for 2 days post procedure.  · Continue all previous medications unless otherwise instructed.    If bleeding should occur following discharge:  · Sit down and apply firm pressure to site with your fingers for 10 minutes  · If the bleeding stops, continue to sit quietly, keeping your wrist straight for 2 hours.  Notify physician as soon as possible ( [phone])  · If bleeding does not stop after 10  minutes, or if there is a large amount of bleeding or spurting, call 911 immediately.  Do not drive yourself to the hospital.

## 2019-01-07 NOTE — OR NURSING
1105 patient arrived from IR s/p left heart cath stent x1 OM, patient wide awake, no complains of pain, right radial approach Tr band clean, angiomax infusing as ordered, vital signs stable.  1131 Post EKG done.  1200 patient given water, snacks tolerating well.  1450 angiomax stopped as ordered.  1455 Criteria met to discharge patient home. Ok to discharge per Dr cheney.  1510 discharge instructions given to patient, patient verbalize understanding of the orders, iv discontinued tip intact, patient not in any distress or discomfort. Surgical site dressing clean.   1511 patient escorted via walking with all his personal belongings.

## 2019-02-24 ENCOUNTER — OFFICE VISIT (OUTPATIENT)
Dept: URGENT CARE | Facility: CLINIC | Age: 59
End: 2019-02-24
Payer: COMMERCIAL

## 2019-02-24 VITALS
HEART RATE: 88 BPM | BODY MASS INDEX: 21.9 KG/M2 | OXYGEN SATURATION: 97 % | TEMPERATURE: 97.6 F | DIASTOLIC BLOOD PRESSURE: 70 MMHG | HEIGHT: 70 IN | WEIGHT: 153 LBS | SYSTOLIC BLOOD PRESSURE: 112 MMHG | RESPIRATION RATE: 18 BRPM

## 2019-02-24 DIAGNOSIS — M54.9 DORSALGIA: ICD-10-CM

## 2019-02-24 PROCEDURE — 99214 OFFICE O/P EST MOD 30 MIN: CPT | Performed by: FAMILY MEDICINE

## 2019-02-24 RX ORDER — HYDROCODONE BITARTRATE AND ACETAMINOPHEN 5; 325 MG/1; MG/1
1 TABLET ORAL EVERY 6 HOURS PRN
Qty: 30 TAB | Refills: 0 | Status: SHIPPED | OUTPATIENT
Start: 2019-02-24 | End: 2019-03-06

## 2019-02-24 RX ORDER — PREDNISONE 20 MG/1
40 TABLET ORAL EVERY MORNING
Qty: 10 TAB | Refills: 0 | Status: SHIPPED | OUTPATIENT
Start: 2019-02-24 | End: 2019-03-01

## 2019-02-24 ASSESSMENT — ENCOUNTER SYMPTOMS
MYALGIAS: 1
HEMOPTYSIS: 0
BACK PAIN: 1
FEVER: 0
DIZZINESS: 0
SHORTNESS OF BREATH: 0
ORTHOPNEA: 0
CHILLS: 0
FOCAL WEAKNESS: 0

## 2019-02-24 NOTE — PROGRESS NOTES
Subjective:      Pantera Parker is a 58 y.o. male who presents with Back Pain (Over 4 wks ago hurt lower back, yesterday lifting boxes at home hurt again )      - This is a very pleasant, well and non-toxic appearing 58 y.o. male with complaints of Lt lower back pain x 2 days. Started after flying to [place] and driving back. Radiates down to Lt knee. Worse movement, better rest.     No fever, no trauma, no bowel/bladder dysfunction or lower limb weakness/heaviness.           ALLERGIES:  Lipitor [atorvastatin]     PMH:  Past Medical History:   Diagnosis Date   • CAD (coronary artery disease)    • High cholesterol         PSH:  Past Surgical History:   Procedure Laterality Date   • CARDIAC CATH  11/30/2018    Stent to 90% LAD,has 90% OM1 that needs intervention.   • HERNIA REPAIR Bilateral     age 24 months   • VASECTOMY Bilateral     1997       MEDS:    Current Outpatient Prescriptions:   •  predniSONE (DELTASONE) 20 MG Tab, Take 2 Tabs by mouth every morning for 5 days., Disp: 10 Tab, Rfl: 0  •  HYDROcodone-acetaminophen (NORCO) 5-325 MG Tab per tablet, Take 1 Tab by mouth every 6 hours as needed for up to 10 days., Disp: 30 Tab, Rfl: 0  •  aspirin 81 MG EC tablet, Take 1 Tab by mouth every day., Disp: 100 Tab, Rfl: 11  •  metoprolol (LOPRESSOR) 25 MG Tab, Take 1 Tab by mouth 2 Times a Day., Disp: 180 Tab, Rfl: 3  •  prasugrel (EFFIENT) 10 MG Tab, Take 1 Tab by mouth every day., Disp: 90 Tab, Rfl: 6  •  rosuvastatin (CRESTOR) 5 MG Tab, Take 1 Tab by mouth every evening., Disp: 90 Tab, Rfl: 3  •  nitroglycerin (NITROSTAT) 0.4 MG SL Tab, Place 1 Tab under tongue as needed for Chest Pain., Disp: 25 Tab, Rfl: 0    ** I have documented what I find to be significant in regards to past medical, social, family and surgical history  in my HPI or under PMH/PSH/FH review section, otherwise it is contributory **           HPI    Review of Systems   Constitutional: Negative for chills and fever.   Respiratory: Negative for  "hemoptysis and shortness of breath.    Cardiovascular: Negative for chest pain and orthopnea.   Musculoskeletal: Positive for back pain and myalgias.   Neurological: Negative for dizziness and focal weakness.          Objective:     /70   Pulse 88   Temp 36.4 °C (97.6 °F)   Resp 18   Ht 1.778 m (5' 10\")   Wt 69.4 kg (153 lb)   SpO2 97%   BMI 21.95 kg/m²      Physical Exam   Constitutional: He appears well-developed. No distress.   HENT:   Head: Normocephalic and atraumatic.   Neck: Neck supple.   Cardiovascular: Regular rhythm.    No murmur heard.  Pulmonary/Chest: Effort normal and breath sounds normal. No respiratory distress.   Neurological: He is alert. He exhibits normal muscle tone.   Skin: Skin is warm and dry.   Psychiatric: He has a normal mood and affect. Judgment normal.   Nursing note and vitals reviewed.    Bilateral lower extremity strength and sensory intact.  Negative straight leg raise.   Some pain to palp/rom           Assessment/Plan:         1. Dorsalgia  predniSONE (DELTASONE) 20 MG Tab    HYDROcodone-acetaminophen (NORCO) 5-325 MG Tab per tablet    Consent for Opiate Prescription             Dx & d/c instructions discussed w/ patient and/or family members.     ER precautions (worsening signs symptoms and when to go to ER) discussed.    Follow up w/ PCP in 2-3 days to make sure symptoms improving and no further intervention/treatment and/or work-up needed was advised, ER if feeling worse or not improving in 2 days.    Possible side effects (i.e. Rash, GI upset/constipation, sedation, elevation of BP or sugars) of any medications given discussed.     Patient left in stable condition              "

## 2019-03-05 LAB
ALBUMIN SERPL-MCNC: 4.7 G/DL (ref 3.5–5.5)
ALBUMIN/GLOB SERPL: 2.2 {RATIO} (ref 1.2–2.2)
ALP SERPL-CCNC: 75 IU/L (ref 39–117)
ALT SERPL-CCNC: 21 IU/L (ref 0–44)
AST SERPL-CCNC: 13 IU/L (ref 0–40)
BILIRUB SERPL-MCNC: 0.9 MG/DL (ref 0–1.2)
BUN SERPL-MCNC: 20 MG/DL (ref 6–24)
BUN/CREAT SERPL: 19 (ref 9–20)
CALCIUM SERPL-MCNC: 9.7 MG/DL (ref 8.7–10.2)
CHLORIDE SERPL-SCNC: 103 MMOL/L (ref 96–106)
CHOLEST SERPL-MCNC: 143 MG/DL (ref 100–199)
CO2 SERPL-SCNC: 26 MMOL/L (ref 20–29)
CREAT SERPL-MCNC: 1.04 MG/DL (ref 0.76–1.27)
GLOBULIN SER CALC-MCNC: 2.1 G/DL (ref 1.5–4.5)
GLUCOSE SERPL-MCNC: 114 MG/DL (ref 65–99)
HBA1C MFR BLD: 5.8 % (ref 4.8–5.6)
HDLC SERPL-MCNC: 55 MG/DL
LABORATORY COMMENT REPORT: ABNORMAL
LDLC SERPL CALC-MCNC: 57 MG/DL (ref 0–99)
POTASSIUM SERPL-SCNC: 3.9 MMOL/L (ref 3.5–5.2)
PROT SERPL-MCNC: 6.8 G/DL (ref 6–8.5)
PSA SERPL-MCNC: 0.6 NG/ML (ref 0–4)
SODIUM SERPL-SCNC: 144 MMOL/L (ref 134–144)
TRIGL SERPL-MCNC: 156 MG/DL (ref 0–149)
VLDLC SERPL CALC-MCNC: 31 MG/DL (ref 5–40)

## 2019-03-07 ENCOUNTER — TELEPHONE (OUTPATIENT)
Dept: CARDIOLOGY | Facility: MEDICAL CENTER | Age: 59
End: 2019-03-07

## 2019-03-07 NOTE — TELEPHONE ENCOUNTER
Update relayed to ANGIE to review labs.    ---------------  VANIA Sanchez 13 minutes ago (8:54 AM)         She was going to order the same blood panel.   I told her Dr. Wheeler had already ordered.   Just giving her a heads-up that the results are in.    You   Pantera Parker 15 minutes ago (8:51 AM)         Demarcus Mott,     It looks like that order was ordered by your PCP.   Were you requesting for Lino to review them as well?     Thank you,   Disha MO for ANGIE Martines A.PCHAR 16 hours ago (4:50 PM)        Lipid Panel results sitting in [facility] dated 3/2/19

## 2019-03-08 NOTE — TELEPHONE ENCOUNTER
Message   Received: Yesterday   Message Contents   VANIA Perez R.N.   Caller: Unspecified (Yesterday,  9:08 AM)             I called the patient and explained his results.  He will follow-up as recommended.

## 2019-06-03 ENCOUNTER — OFFICE VISIT (OUTPATIENT)
Dept: NEUROLOGY | Facility: MEDICAL CENTER | Age: 59
End: 2019-06-03
Payer: COMMERCIAL

## 2019-06-03 VITALS
SYSTOLIC BLOOD PRESSURE: 138 MMHG | HEIGHT: 71 IN | TEMPERATURE: 97.4 F | DIASTOLIC BLOOD PRESSURE: 84 MMHG | HEART RATE: 82 BPM | BODY MASS INDEX: 21.17 KG/M2 | RESPIRATION RATE: 15 BRPM | WEIGHT: 151.2 LBS | OXYGEN SATURATION: 98 %

## 2019-06-03 DIAGNOSIS — G20.A1 PARKINSON DISEASE: Primary | ICD-10-CM

## 2019-06-03 PROCEDURE — 99205 OFFICE O/P NEW HI 60 MIN: CPT | Performed by: PSYCHIATRY & NEUROLOGY

## 2019-06-03 RX ORDER — RASAGILINE 1 MG/1
1 TABLET ORAL
Refills: 6 | COMMUNITY
Start: 2019-05-24 | End: 2019-06-20 | Stop reason: SDUPTHER

## 2019-06-03 ASSESSMENT — ENCOUNTER SYMPTOMS
INSOMNIA: 0
FALLS: 0
HALLUCINATIONS: 0
DOUBLE VISION: 0
DEPRESSION: 0
MEMORY LOSS: 0
LOSS OF CONSCIOUSNESS: 0

## 2019-06-03 ASSESSMENT — PATIENT HEALTH QUESTIONNAIRE - PHQ9: CLINICAL INTERPRETATION OF PHQ2 SCORE: 0

## 2019-06-03 ASSESSMENT — PAIN SCALES - GENERAL: PAINLEVEL: NO PAIN

## 2019-06-03 NOTE — PROGRESS NOTES
"Subjective:      Pantera Parker is a 59 y.o. male who presents from the office of Dr. Wheelre, for consultation, with a history of right-sided ruby-parkinsonism.    HPI    Pantera is a pleasant 59-year-old left-handed  gentleman whose tremulousness involve the right hand, he became aware of this in the early part of 2017.  He describes this as a \"quivering\" in the right hand that was present when he was using the extremity to a degree.  Over time he became more aware of it when he was not active with the hand itself.  It would fluctuate, good days and bad days are described, but it always seem to be more apparent when he was at rest or otherwise distracted.  It would improve when he use the extremity.  In addition he noted a slowness with move both with the right arm and leg.  He would stand easily but would have to think about leading with his right leg.  Stride length was never a problem, he was not tripping or shuffling.    Cognition throughout this time has been intact.  He denies any emotional changes.  There has been no issue with loss of taste or smell, swallowing difficulties or drooling.  He describes the tremulousness but no clear loss of dexterity with the right hand.  Handwriting is always been small, he denies any further reduction.  He has not been falling with regularity.    He denies issues with constipation, urinary retention, orthostatic dizziness or syncope, early satiety with vomiting, etc.  He sleeps well, denies daytime drowsiness, has no history of REM behavioral disorder or hallucinations and frequent nightmares.  Left upper and lower extremity have never been involved.    Diagnosed by local neurologist is likely Parkinson's disease, MRI of the brain was unremarkable, he has never had an EEG or blood work drawn.  He saw a movement disorder specialist at [facility], Dr. Chavez, who concurred that he does have parkinsonism.    Placed on Azilect 1 mg daily, for about 1 year he was not overly " "impressed, he stopped the drug for maybe 6 months, again not impressed that there was a worsening, though the tremor had continued to slowly progress overall.  He has been back on the drug without real symptomatic benefit.  He has been on no other medications.    He has a history of dyslipidemia, CAD, mild hypertension, no history of CVA, PVD, other neuro degenerative disease, MS, seizure, migraine, malignancy, thyroid disease, blood dyscrasia, liver or kidney disease, psychiatric disease or autoimmune disease.  There is no surgical history of note from my standpoint.    No one in the immediate family has suffered from any type of tremor or other neurologic disease.  His father  at 78 with a history of colon cancer, his mother is still alive at 94 with dyslipidemia.  7-year-old brother and his son are both alive and well.  He is a retired , rarely drinks alcohol, does not smoke and denies MSA.    He is on Lopressor 25 mg, twice daily, Effient 10 mg daily, Crestor 5 mg every evening, Azilect 1 mg daily, baby aspirin daily and nitroglycerin as needed.    Review of Systems   Constitutional: Negative for malaise/fatigue.   Eyes: Negative for double vision.   Musculoskeletal: Negative for falls.   Neurological: Negative for loss of consciousness.   Psychiatric/Behavioral: Negative for depression, hallucinations and memory loss. The patient does not have insomnia.    All other systems reviewed and are negative.       Objective:     /84 (BP Location: Right arm, Patient Position: Sitting)   Pulse 82   Temp 36.3 °C (97.4 °F) (Temporal)   Resp 15   Ht 1.803 m (5' 11\")   Wt 68.6 kg (151 lb 3.2 oz)   SpO2 98%   BMI 21.09 kg/m²      Physical Exam    He appears in no acute distress.  His vital signs are stable.  There is no malar rash, jaw claudication, or sialorrhea.  The neck is supple, range of motion is full, carotid pulses are present bilaterally without asymmetry or bruits.  Cardiac " evaluation reveals a regular rhythm.  There is no lower extremity edema.    Fully oriented, there is no aphasia, agnosia, apraxia, or inattention.  Delayed recall is intact, there are no issues with similarities or intermediate memory.  His mood is euthymic, affect is mood appropriate though there is some mild bradykinesia.    PERRLA/EOMI, glabellar tap is absent, facial movements are symmetric, visual fields are full to finger counting bilaterally on confrontation.  Sensory exam is intact to temperature and light touch bilaterally, there is mild hypophonia but no dysarthria, eye blink frequency is diminished.  The tongue and uvula are midline, shoulder shrug is slowed on the right when compared to the left but the movements are symmetric in their entirety.    Musculoskeletal exam does reveal a tremor at rest, with a higher amplitude and lower frequency, pill-rolling quality.  There is slight bradykinesia, but there is unilateral rigidity with the right upper extremity, tone is actually near normal with the left upper extremity.  There is no asterixis or drift.  Strength is intact in all 4 extremities.  There are no obvious reflex asymmetries though I have difficulty eliciting ankle jerks bilaterally.  Both toes are downgoing.    He stands easily, does not require assistance, but does turn stiffly almost en-bloc, the arm swing on the right is absent when he walks, there is no circumduction and stride length is actually maintained bilaterally.  He does not shuffle, he is not stooped in posture.  There is no appendicular dystaxia in any of the extremities.  Repetitive movements do show a diminished amplitude and increased frequency with both lower and upper extremity on the right only.    Sensory exam is intact to vibration and temperature, Romberg is absent.     Assessment/Plan:     1. Parkinson disease (HCC)  This gentleman clearly does have parkinsonism, in all likelihood it is Parkinson's disease.  The unusual  feature is simply his young age though it is not unheard of.  There is no family history identified, often genetics plays a role in younger onset.  In any case, the tremor, unilaterality, slowness of movements, tone changes, etc. are all consistent.  He lacks the history of sleep distortion and cognitive symptomatology that would be consistent with Lewy Body Disease.  He lacks the eye movement dysfunction that would be suggestive of PSP, he also lacks more bulbar symptoms, postural instability and upper motor findings.    Imaging is already ruled out structural pathology, he is not on medication that can induce a secondary parkinsonian state.  For now we can simply observe, though I did recommend stopping the Azilect and seeing if in fact there is any worsening of symptoms off the drug.  If so, it can be reinitiated.  If there is no change, he was told he can simply discontinue the drug entirely.  He would like to avoid adding Sinemet or anything else for that matter at this time, he is dealing well with the minimal symptomatology that he has.    Spent some time talking about the nature of the condition itself, its degenerative quality and the slow inexorable progression that will occur.  I encouraged him to get physically active, something that is anathma to him.  He knows when and what to watch for that might necessitate initiating medication.  Since medicines are symptomatically based, he certainly can afford to wait for as long as he would like.  He can follow-up in 1 year.  Phone contact in the interim if needed.    Time: 60 minutes spent face-to-face for exam, review, discussion, and education, of this over 60% of the time spent counseling and coordinating care.

## 2019-06-20 RX ORDER — RASAGILINE 1 MG/1
1 TABLET ORAL
Qty: 30 TAB | Refills: 6 | Status: SHIPPED | OUTPATIENT
Start: 2019-06-20 | End: 2020-06-03 | Stop reason: SDUPTHER

## 2019-08-27 ENCOUNTER — TELEPHONE (OUTPATIENT)
Dept: NEUROLOGY | Facility: MEDICAL CENTER | Age: 59
End: 2019-08-27

## 2019-08-27 NOTE — TELEPHONE ENCOUNTER
If he would like, we can start Rytary 3 times a day.  Let us know and I can call some into his pharmacy.

## 2019-08-27 NOTE — TELEPHONE ENCOUNTER
----- Message from Calos Cabrera, Med Ass't sent at 8/26/2019  4:47 PM PDT -----  Regarding: FW: Prescription Question  Contact: [phone]      ----- Message -----  From: Pantera Parker  Sent: 8/26/2019   2:53 PM PDT  To: Neurology Mas  Subject: Prescription Question                            The tremor in my left hand is still continuing.  Didn't really talk about the pros and cons of carbidopa/levadopa, but seriously need to consider starting on it or something similar, as I expected the tremors to continue and will affect my daily tasks if left unchecked.  Appreciate your thoughts/recommendations as to a course of action.    Thanks in advance.  Pantera Parker

## 2019-10-14 ENCOUNTER — TELEPHONE (OUTPATIENT)
Dept: NEUROLOGY | Facility: MEDICAL CENTER | Age: 59
End: 2019-10-14

## 2019-10-14 DIAGNOSIS — G20.A1 PARKINSON DISEASE: ICD-10-CM

## 2019-10-15 NOTE — TELEPHONE ENCOUNTER
Please let Mr. Parker know that I forwarded a prescription for his Rytary medication, he is to take this 3 times a day, 30 minutes before each meal.  He stays on his Azilect/rasagiline 1 mg daily.

## 2019-10-15 NOTE — TELEPHONE ENCOUNTER
"----- Message from Latia Dumont, Med Ass't sent at 10/14/2019 10:54 AM PDT -----  Regarding: FW: Prescription Question  Contact: [phone]  Patient would like to try the Rytary please. Please send to the pharmacy on file    Thank you,  Latia  ----- Message -----  From: Pantera Parker  Sent: 10/13/2019  10:25 AM PDT  To: Neurology Mas  Subject: Prescription Question                            After further consideration, lets proceed with the Rytary prescription.    Please use the [facility] on [address] for this prescription.    Thanks in advance.  Pantera Parker    Per Dr. Batista:  \"If he would like, we can start Rytary 3 times a day. Let us know and I can call some into his pharmacy.\"    "

## 2019-11-11 ENCOUNTER — OFFICE VISIT (OUTPATIENT)
Dept: CARDIOLOGY | Facility: MEDICAL CENTER | Age: 59
End: 2019-11-11
Payer: COMMERCIAL

## 2019-11-11 VITALS
DIASTOLIC BLOOD PRESSURE: 80 MMHG | SYSTOLIC BLOOD PRESSURE: 118 MMHG | WEIGHT: 148 LBS | BODY MASS INDEX: 20.72 KG/M2 | HEIGHT: 71 IN | HEART RATE: 82 BPM | OXYGEN SATURATION: 96 %

## 2019-11-11 DIAGNOSIS — R09.89 BRUIT OF LEFT CAROTID ARTERY: ICD-10-CM

## 2019-11-11 DIAGNOSIS — I25.83 CORONARY ARTERY DISEASE DUE TO LIPID RICH PLAQUE: ICD-10-CM

## 2019-11-11 DIAGNOSIS — Z95.5 STATUS POST INSERTION OF DRUG ELUTING CORONARY ARTERY STENT: ICD-10-CM

## 2019-11-11 DIAGNOSIS — I25.10 CORONARY ARTERY DISEASE DUE TO LIPID RICH PLAQUE: ICD-10-CM

## 2019-11-11 DIAGNOSIS — E78.5 DYSLIPIDEMIA: ICD-10-CM

## 2019-11-11 PROBLEM — Z80.0 FAMILY HISTORY OF COLON CANCER: Status: RESOLVED | Noted: 2017-02-10 | Resolved: 2019-11-11

## 2019-11-11 PROBLEM — R25.1 TREMOR: Status: RESOLVED | Noted: 2017-02-10 | Resolved: 2019-11-11

## 2019-11-11 PROCEDURE — 99214 OFFICE O/P EST MOD 30 MIN: CPT | Performed by: INTERNAL MEDICINE

## 2019-11-11 ASSESSMENT — ENCOUNTER SYMPTOMS
EYE PAIN: 0
PND: 0
SPEECH CHANGE: 0
CHILLS: 0
DEPRESSION: 0
NERVOUS/ANXIOUS: 0
MYALGIAS: 0
ABDOMINAL PAIN: 0
DIZZINESS: 0
WHEEZING: 0
COUGH: 0
EYE DISCHARGE: 0
NAUSEA: 0
VOMITING: 0
BLURRED VISION: 0
LOSS OF CONSCIOUSNESS: 0
HEMOPTYSIS: 0
INSOMNIA: 0
FEVER: 0
BRUISES/BLEEDS EASILY: 0
PALPITATIONS: 0

## 2019-11-18 ENCOUNTER — HOSPITAL ENCOUNTER (OUTPATIENT)
Dept: RADIOLOGY | Facility: MEDICAL CENTER | Age: 59
End: 2019-11-18
Attending: INTERNAL MEDICINE
Payer: COMMERCIAL

## 2019-11-18 DIAGNOSIS — R09.89 BRUIT OF LEFT CAROTID ARTERY: ICD-10-CM

## 2019-11-18 PROCEDURE — 93880 EXTRACRANIAL BILAT STUDY: CPT | Mod: 26 | Performed by: INTERNAL MEDICINE

## 2019-11-18 PROCEDURE — 93880 EXTRACRANIAL BILAT STUDY: CPT

## 2019-11-19 ENCOUNTER — TELEPHONE (OUTPATIENT)
Dept: CARDIOLOGY | Facility: MEDICAL CENTER | Age: 59
End: 2019-11-19

## 2019-11-19 NOTE — TELEPHONE ENCOUNTER
Result Notes for US-CAROTID DOPPLER BILAT   Notes recorded by Avelina Mitchell M.D. on 11/18/2019 at 9:04 PM PST  Great news normal carotid     Called pt, discussed Carotid US per Dr Mitchell, pt verbalizes understanding

## 2020-01-21 ENCOUNTER — OFFICE VISIT (OUTPATIENT)
Dept: MEDICAL GROUP | Facility: MEDICAL CENTER | Age: 60
End: 2020-01-21
Payer: COMMERCIAL

## 2020-01-21 VITALS
WEIGHT: 149 LBS | HEART RATE: 65 BPM | HEIGHT: 71 IN | OXYGEN SATURATION: 97 % | SYSTOLIC BLOOD PRESSURE: 110 MMHG | TEMPERATURE: 97.2 F | BODY MASS INDEX: 20.86 KG/M2 | DIASTOLIC BLOOD PRESSURE: 60 MMHG

## 2020-01-21 DIAGNOSIS — Z00.00 WELLNESS EXAMINATION: ICD-10-CM

## 2020-01-21 DIAGNOSIS — R73.02 IGT (IMPAIRED GLUCOSE TOLERANCE): ICD-10-CM

## 2020-01-21 DIAGNOSIS — I25.10 CORONARY ARTERY DISEASE DUE TO LIPID RICH PLAQUE: ICD-10-CM

## 2020-01-21 DIAGNOSIS — I25.83 CORONARY ARTERY DISEASE DUE TO LIPID RICH PLAQUE: ICD-10-CM

## 2020-01-21 DIAGNOSIS — E78.5 DYSLIPIDEMIA: ICD-10-CM

## 2020-01-21 DIAGNOSIS — G20.A1 PARKINSON DISEASE (HCC): ICD-10-CM

## 2020-01-21 LAB
HBA1C MFR BLD: 5.7 % (ref 0–5.6)
INT CON NEG: NEGATIVE
INT CON POS: POSITIVE

## 2020-01-21 PROCEDURE — 99396 PREV VISIT EST AGE 40-64: CPT | Performed by: INTERNAL MEDICINE

## 2020-01-21 PROCEDURE — 83036 HEMOGLOBIN GLYCOSYLATED A1C: CPT | Performed by: INTERNAL MEDICINE

## 2020-01-21 RX ORDER — PRASUGREL 10 MG/1
10 TABLET, FILM COATED ORAL DAILY
Qty: 90 TAB | Refills: 3 | Status: SHIPPED | OUTPATIENT
Start: 2020-01-21 | End: 2021-06-03

## 2020-01-21 RX ORDER — ROSUVASTATIN CALCIUM 5 MG/1
5 TABLET, COATED ORAL EVERY EVENING
Qty: 90 TAB | Refills: 3 | Status: SHIPPED | OUTPATIENT
Start: 2020-01-21

## 2020-01-21 ASSESSMENT — PATIENT HEALTH QUESTIONNAIRE - PHQ9: CLINICAL INTERPRETATION OF PHQ2 SCORE: 0

## 2020-01-21 NOTE — PROGRESS NOTES
"      CC: Wellness examination                                                                                                                                      HPI:   Pantera presents today with the following.    1. Wellness examination  Presents for wellness examination he is coming due for recheck of cholesterol.  Blood sugars been slightly elevated a recent A1c is stable at 5.7.  Denying excessive thirst or urination.  Terms of his Parkinson's he is stable followed by neurology.  Denies any other acute complaints today.            Patient Active Problem List    Diagnosis Date Noted   • Coronary artery disease due to lipid rich plaque 12/13/2018     Priority: Medium   • Dyslipidemia 10/06/2015     Priority: Low   • Bruit of left carotid artery 11/11/2019   • Parkinson disease (HCC) 12/13/2018   • Status post insertion of drug eluting coronary artery stent 11/30/2018   • Diverticulosis of large intestine 10/06/2015       Current Outpatient Medications   Medication Sig Dispense Refill   • metoprolol (LOPRESSOR) 25 MG Tab Take 1 Tab by mouth 2 Times a Day. 180 Tab 3   • rosuvastatin (CRESTOR) 5 MG Tab Take 1 Tab by mouth every evening. 90 Tab 3   • prasugrel (EFFIENT) 10 MG Tab Take 1 Tab by mouth every day. 90 Tab 3   • Carbidopa-Levodopa ER (RYTARY) 36. MG Cap CR Take 1 Tab by mouth 3 times a day. 90 Cap 4   • rasagiline (AZILECT) 1 MG Tab Take 1 Tab by mouth every day. 30 Tab 6   • aspirin 81 MG EC tablet Take 1 Tab by mouth every day. 100 Tab 11   • nitroglycerin (NITROSTAT) 0.4 MG SL Tab Place 1 Tab under tongue as needed for Chest Pain. 25 Tab 0     No current facility-administered medications for this visit.          Allergies as of 01/21/2020 - Reviewed 01/21/2020   Allergen Reaction Noted   • Lipitor [atorvastatin]  02/10/2017        ROS: Denies Chest pain, SOB, LE edema.    /60 (BP Location: Left arm, Patient Position: Sitting)   Pulse 65   Temp 36.2 °C (97.2 °F)   Ht 1.803 m (5' 11\")   " Wt 67.6 kg (149 lb)   SpO2 97%   BMI 20.78 kg/m²     Physical Exam:  Gen:         Alert and oriented, No apparent distress.  Neck:        No Lymphadenopathy or Bruits.  Lungs:     Clear to auscultation bilaterally  CV:          Regular rate and rhythm. No murmurs, rubs or gallops.               Ext:          No clubbing, cyanosis, edema.      Assessment and Plan.   59 y.o. male with the following issues.    1. Wellness examination  Discussed healthy lifestyle habits as well as screening regimens.  Discussion about safe lifestyle practices, seatbelts, sunscreen, dietary recommendations.

## 2020-01-22 LAB
ALBUMIN SERPL-MCNC: 4.8 G/DL (ref 3.8–4.9)
ALBUMIN/GLOB SERPL: 2.5 {RATIO} (ref 1.2–2.2)
ALP SERPL-CCNC: 81 IU/L (ref 39–117)
ALT SERPL-CCNC: 8 IU/L (ref 0–44)
AST SERPL-CCNC: 15 IU/L (ref 0–40)
BILIRUB SERPL-MCNC: 0.8 MG/DL (ref 0–1.2)
BUN SERPL-MCNC: 16 MG/DL (ref 6–24)
BUN/CREAT SERPL: 16 (ref 9–20)
CALCIUM SERPL-MCNC: 9.7 MG/DL (ref 8.7–10.2)
CHLORIDE SERPL-SCNC: 103 MMOL/L (ref 96–106)
CHOLEST SERPL-MCNC: 122 MG/DL (ref 100–199)
CO2 SERPL-SCNC: 24 MMOL/L (ref 20–29)
CREAT SERPL-MCNC: 1.01 MG/DL (ref 0.76–1.27)
GLOBULIN SER CALC-MCNC: 1.9 G/DL (ref 1.5–4.5)
GLUCOSE SERPL-MCNC: 101 MG/DL (ref 65–99)
HDLC SERPL-MCNC: 48 MG/DL
LABORATORY COMMENT REPORT: NORMAL
LDLC SERPL CALC-MCNC: 46 MG/DL (ref 0–99)
POTASSIUM SERPL-SCNC: 4.7 MMOL/L (ref 3.5–5.2)
PROT SERPL-MCNC: 6.7 G/DL (ref 6–8.5)
SODIUM SERPL-SCNC: 143 MMOL/L (ref 134–144)
TRIGL SERPL-MCNC: 141 MG/DL (ref 0–149)
VLDLC SERPL CALC-MCNC: 28 MG/DL (ref 5–40)

## 2020-03-23 DIAGNOSIS — G20.A1 PARKINSON DISEASE (HCC): ICD-10-CM

## 2020-03-23 RX ORDER — LEVODOPA AND CARBIDOPA 145; 36.25 MG/1; MG/1
1 CAPSULE, EXTENDED RELEASE ORAL 3 TIMES DAILY
Qty: 90 CAP | Refills: 4 | Status: SHIPPED | OUTPATIENT
Start: 2020-03-23 | End: 2020-06-03 | Stop reason: SDUPTHER

## 2020-03-23 NOTE — TELEPHONE ENCOUNTER
Received request via: Patient    Was the patient seen in the last year in this department? Yes    Does the patient have an active prescription (recently filled or refills available) for medication(s) requested? Yes.

## 2020-05-25 NOTE — PROGRESS NOTES
Chief Complaint   Patient presents with   • Coronary Artery Disease       Subjective:   Pantera Parker is a 59 y.o. male who presents today in follow-up after his coronary disease manifested by an acute coronary syndrome last November, warranting staged intervention to the culprit LAD as well as an OM1    Done exceedingly well  Parkinson's bothersome's but he is very philosophical about it, progressive attitude  Medications are tolerable and affordable sometimes gets bruising but no serious side effects    Very intellectually curious    Past Medical History:   Diagnosis Date   • CAD (coronary artery disease)    • High cholesterol      Past Surgical History:   Procedure Laterality Date   • ZZZ CARDIAC CATH  11/30/2018    Stent to 90% LAD,has 90% OM1 that needs intervention.   • HERNIA REPAIR Bilateral     age 24 months   • VASECTOMY Bilateral     1997     Family History   Problem Relation Age of Onset   • GI Disease Mother    • Cancer Father      Social History     Socioeconomic History   • Marital status:      Spouse name: Not on file   • Number of children: Not on file   • Years of education: Not on file   • Highest education level: Not on file   Occupational History   • Not on file   Social Needs   • Financial resource strain: Not on file   • Food insecurity:     Worry: Not on file     Inability: Not on file   • Transportation needs:     Medical: Not on file     Non-medical: Not on file   Tobacco Use   • Smoking status: Never Smoker   • Smokeless tobacco: Never Used   Substance and Sexual Activity   • Alcohol use: Yes     Alcohol/week: 0.6 oz     Types: 1 Shots of liquor per week   • Drug use: No   • Sexual activity: Yes     Partners: Female     Birth control/protection: Surgical   Lifestyle   • Physical activity:     Days per week: Not on file     Minutes per session: Not on file   • Stress: Not on file   Relationships   • Social connections:     Talks on phone: Not on file     Gets together: Not on file  PT IS WHEELED TO CT SCAN [facility]IA [facility].     Attends Sabianist service: Not on file     Active member of club or organization: Not on file     Attends meetings of clubs or organizations: Not on file     Relationship status: Not on file   • Intimate partner violence:     Fear of current or ex partner: Not on file     Emotionally abused: Not on file     Physically abused: Not on file     Forced sexual activity: Not on file   Other Topics Concern   • Not on file   Social History Narrative   • Not on file     Allergies   Allergen Reactions   • Lipitor [Atorvastatin]      Arm stiffness     Outpatient Encounter Medications as of 11/11/2019   Medication Sig Dispense Refill   • rosuvastatin (CRESTOR) 5 MG Tab Take 1 Tab by mouth every evening. 90 Tab 0   • Carbidopa-Levodopa ER (RYTARY) 36. MG Cap CR Take 1 Tab by mouth 3 times a day. 90 Cap 4   • rasagiline (AZILECT) 1 MG Tab Take 1 Tab by mouth every day. 30 Tab 6   • aspirin 81 MG EC tablet Take 1 Tab by mouth every day. 100 Tab 11   • metoprolol (LOPRESSOR) 25 MG Tab Take 1 Tab by mouth 2 Times a Day. 180 Tab 3   • prasugrel (EFFIENT) 10 MG Tab Take 1 Tab by mouth every day. 90 Tab 6   • nitroglycerin (NITROSTAT) 0.4 MG SL Tab Place 1 Tab under tongue as needed for Chest Pain. 25 Tab 0     No facility-administered encounter medications on file as of 11/11/2019.      Review of Systems   Constitutional: Negative for chills, fever and malaise/fatigue.   HENT: Negative for congestion and hearing loss.    Eyes: Negative for blurred vision, pain and discharge.   Respiratory: Negative for cough, hemoptysis and wheezing.    Cardiovascular: Negative for chest pain, palpitations, leg swelling and PND.   Gastrointestinal: Negative for abdominal pain, nausea and vomiting.   Genitourinary: Negative for dysuria and urgency.   Musculoskeletal: Negative for joint pain and myalgias.   Skin: Negative for itching and rash.   Neurological: Negative for dizziness, speech change and loss of consciousness.  "  Endo/Heme/Allergies: Does not bruise/bleed easily.   Psychiatric/Behavioral: Negative for depression. The patient is not nervous/anxious and does not have insomnia.    All other systems reviewed and are negative.       Objective:   /80 (BP Location: Left arm, Patient Position: Sitting, BP Cuff Size: Adult)   Pulse 82   Ht 1.803 m (5' 11\")   Wt 67.1 kg (148 lb)   SpO2 96%   BMI 20.64 kg/m²     Physical Exam   Constitutional: He is oriented to person, place, and time. No distress.   HENT:   Nose: Nose normal.   Mouth/Throat: No oropharyngeal exudate.   Eyes: Pupils are equal, round, and reactive to light. EOM are normal. No scleral icterus.   Neck: No JVD present. No thyromegaly present.   Cardiovascular: Normal rate, regular rhythm and intact distal pulses.   No murmur heard.  Pulmonary/Chest: Breath sounds normal. No respiratory distress.   Abdominal: Bowel sounds are normal. He exhibits no distension.   Musculoskeletal:         General: No tenderness or edema.   Neurological: He is alert and oriented to person, place, and time.   Resting tremor of the hands right more than left   Skin: Skin is warm and dry. No rash noted. He is not diaphoretic.   Psychiatric: He has a normal mood and affect. His behavior is normal.       Assessment:     1. Dyslipidemia     2. Status post insertion of drug eluting coronary artery stent     3. Coronary artery disease due to lipid rich plaque     4. Bruit of left carotid artery  US-CAROTID DOPPLER BILAT       Medical Decision Making:  Today's Assessment / Status / Plan:     Coronary disease  We reviewed anatomy  I looked at the images of his echo which showed normal heart function  Prognosis quite good  Without a bleeding risk I would recommend dual antiplatelet therapy for life, and less risk and benefit options change  High-dose statin, labs good recheck in March  Low-dose metoprolol for pleotrophic effect after MI/ACS    Carotid bruit  Audible on the left  Talked about " screening, I did order a carotid ultrasound to rule out flow-limiting disease    Hyperlipidemia  Likely familial, high-dose statin therapy as above    RTC 6 months, his wife works with wild horses.  Very impressive.  We talked also about end-of-life care planning particularly with his chronic Parkinson's

## 2020-06-03 ENCOUNTER — OFFICE VISIT (OUTPATIENT)
Dept: NEUROLOGY | Facility: MEDICAL CENTER | Age: 60
End: 2020-06-03
Payer: COMMERCIAL

## 2020-06-03 VITALS
HEART RATE: 80 BPM | DIASTOLIC BLOOD PRESSURE: 76 MMHG | WEIGHT: 143.3 LBS | OXYGEN SATURATION: 95 % | HEIGHT: 71 IN | BODY MASS INDEX: 20.06 KG/M2 | SYSTOLIC BLOOD PRESSURE: 126 MMHG

## 2020-06-03 DIAGNOSIS — G20.A1 PARKINSON DISEASE (HCC): ICD-10-CM

## 2020-06-03 PROCEDURE — 99213 OFFICE O/P EST LOW 20 MIN: CPT | Performed by: PSYCHIATRY & NEUROLOGY

## 2020-06-03 RX ORDER — LEVODOPA AND CARBIDOPA 145; 36.25 MG/1; MG/1
1 CAPSULE, EXTENDED RELEASE ORAL 3 TIMES DAILY
Qty: 270 CAP | Refills: 3 | Status: SHIPPED | OUTPATIENT
Start: 2020-06-03 | End: 2021-06-03 | Stop reason: SDUPTHER

## 2020-06-03 RX ORDER — RASAGILINE 1 MG/1
1 TABLET ORAL
Qty: 90 TAB | Refills: 3 | Status: SHIPPED | OUTPATIENT
Start: 2020-06-03 | End: 2021-06-03 | Stop reason: SDUPTHER

## 2020-06-03 ASSESSMENT — ENCOUNTER SYMPTOMS
SENSORY CHANGE: 0
CONSTIPATION: 0
TREMORS: 1
FALLS: 0
MEMORY LOSS: 0

## 2020-06-03 ASSESSMENT — FIBROSIS 4 INDEX: FIB4 SCORE: 0.95

## 2020-06-03 NOTE — PROGRESS NOTES
Subjective:      Pantera Parker is a 60 y.o. male who presents for follow-up, with a history of mild unilateral, right-sided Parkinson's disease.    HPI    Since last seen, we had started Pantera on Azilect 1 mg every morning, the tremor of the right hand seem to have gotten a little better, he stopped the drug for an interval of time, and he began to notice tremor not just in the right hand but to a lesser degree in the left.  Restarting the drug, things improved though they never resolved.  Over time the tremulousness in general began to bother him more, and active video , he had more difficulty operating the controller using both hands.  We started Rytary 36.25/145, 1 tablet, 3 times daily, with good benefit.  He denies wearing-off, peak-dose dyskinesias, etc.    On his regimen, movements in general are easier, his feet do not feel like they are in cement boots, moving in general is less sluggish.  Even with the hands the spontaneity of movements is easier.  Tremor is improved though he is more likely to be aware of it when otherwise in repose or distracted.  Voice volume is only minimally diminished, he certainly can be heard across the room, with good clarity, if need be.  He denies fluctuations, on/off episodes, etc.    He denies dizziness or syncope, nausea or vomiting with early satiety, persistent urinary retention and constipation.  Though he had some vivid dreams, on the drugs, he is actually sleeping better.  Overall he feels improved.    Medical, surgical and family histories are reviewed, there are no new drug allergies.  In addition to the Rytary and Azilect, he is on baby aspirin, Crestor, Effient and Lopressor.    Review of Systems   Constitutional: Negative for malaise/fatigue.   Gastrointestinal: Negative for constipation.   Genitourinary: Negative for frequency and urgency.   Musculoskeletal: Negative for falls.   Neurological: Positive for tremors. Negative for sensory change.  "  Psychiatric/Behavioral: Negative for memory loss.   All other systems reviewed and are negative.       Objective:     /76 (BP Location: Left arm, Patient Position: Sitting, BP Cuff Size: Adult)   Pulse 80   Ht 1.791 m (5' 10.5\")   Wt 65 kg (143 lb 4.8 oz)   SpO2 95%   BMI 20.27 kg/m²      Physical Exam    He appears in no acute distress.  Vital signs are stable.  There is no malar rash.  The neck is supple.  There is no sialorrhea.  Cardiac evaluation is unremarkable.    He is fully oriented, there is no aphasia.    PERRLA/EOMI, visual fields are grossly full, there is only subtle hypophonia but no tachyphemia or dysarthria.  Facial movements are symmetric, there is no bulbar dysfunction.  Eye blink frequency is only slightly diminished.    Resting tremor and rigidity with the right hand is seen, improved.  There is no asterixis or drift.  There is some mild bradykinesia overall, this too is lessened.  Strength is intact in all 4 extremities.  There are no obvious reflex asymmetries.    There is no appendicular dystaxia throughout, the movements with the right hand/arm and foot/leg are simply slower.  Repetitive movements are actually improved with the right hand when compared to previous exam.  Still not on par with the left, the same differences seen in the lower extremities.  He stands easily, turning is easy though the right arm swing is absent, he does turn stiffly.  There is no obvious postural instability.    Sensory exam is intact light touch.     Assessment/Plan:     1. Parkinson disease (HCC)  The diagnosis is for the most part confirmed with a positive response to medication.  We spent some time talking about the nature of the disease, its long-term prognosis and progression, I gave him a basically 0.5 disease severity with unilaterality, no postural instability and little to no functional incapacity.  He was told that this will likely be the state of order for a couple of years with " adjustments of medications upwards depending on need subjectively.  I am quite pleased with how well he is doing.  He was told he is on a very minimal dose of medication.  He and I can follow-up in 1 year.    - rasagiline (AZILECT) 1 MG Tab; Take 1 Tab by mouth every day.  Dispense: 90 Tab; Refill: 3  - Carbidopa-Levodopa ER (RYTARY) 36. MG Cap CR; Take 1 Tab by mouth 3 times a day.  Dispense: 270 Cap; Refill: 3    Time: 20 minutes spent face-to-face for exam, review, discussion, and education, of this over 50% of the time spent counseling and coordinating care.

## 2021-03-15 DIAGNOSIS — Z23 NEED FOR VACCINATION: ICD-10-CM

## 2021-03-19 ENCOUNTER — IMMUNIZATION (OUTPATIENT)
Dept: FAMILY PLANNING/WOMEN'S HEALTH CLINIC | Facility: IMMUNIZATION CENTER | Age: 61
End: 2021-03-19
Attending: INTERNAL MEDICINE
Payer: COMMERCIAL

## 2021-03-19 DIAGNOSIS — Z23 ENCOUNTER FOR VACCINATION: Primary | ICD-10-CM

## 2021-03-19 DIAGNOSIS — Z23 NEED FOR VACCINATION: ICD-10-CM

## 2021-03-19 PROCEDURE — 91301 MODERNA SARS-COV-2 VACCINE: CPT

## 2021-03-19 PROCEDURE — 0011A MODERNA SARS-COV-2 VACCINE: CPT

## 2021-04-17 ENCOUNTER — IMMUNIZATION (OUTPATIENT)
Dept: FAMILY PLANNING/WOMEN'S HEALTH CLINIC | Facility: IMMUNIZATION CENTER | Age: 61
End: 2021-04-17
Attending: INTERNAL MEDICINE
Payer: COMMERCIAL

## 2021-04-17 DIAGNOSIS — Z23 ENCOUNTER FOR VACCINATION: Primary | ICD-10-CM

## 2021-04-17 PROCEDURE — 91301 MODERNA SARS-COV-2 VACCINE: CPT | Performed by: INTERNAL MEDICINE

## 2021-04-17 PROCEDURE — 0012A MODERNA SARS-COV-2 VACCINE: CPT | Performed by: INTERNAL MEDICINE

## 2021-06-03 ENCOUNTER — OFFICE VISIT (OUTPATIENT)
Dept: NEUROLOGY | Facility: MEDICAL CENTER | Age: 61
End: 2021-06-03
Attending: PSYCHIATRY & NEUROLOGY
Payer: COMMERCIAL

## 2021-06-03 VITALS
OXYGEN SATURATION: 96 % | WEIGHT: 151.9 LBS | TEMPERATURE: 97.9 F | DIASTOLIC BLOOD PRESSURE: 80 MMHG | BODY MASS INDEX: 21.27 KG/M2 | HEART RATE: 74 BPM | SYSTOLIC BLOOD PRESSURE: 112 MMHG | HEIGHT: 71 IN

## 2021-06-03 DIAGNOSIS — G20.A1 PARKINSON DISEASE (HCC): ICD-10-CM

## 2021-06-03 PROCEDURE — 99211 OFF/OP EST MAY X REQ PHY/QHP: CPT | Performed by: PSYCHIATRY & NEUROLOGY

## 2021-06-03 PROCEDURE — 99214 OFFICE O/P EST MOD 30 MIN: CPT | Performed by: PSYCHIATRY & NEUROLOGY

## 2021-06-03 RX ORDER — LEVODOPA AND CARBIDOPA 145; 36.25 MG/1; MG/1
1 CAPSULE, EXTENDED RELEASE ORAL 3 TIMES DAILY
Qty: 270 CAPSULE | Refills: 3 | Status: SHIPPED | OUTPATIENT
Start: 2021-06-03

## 2021-06-03 RX ORDER — RASAGILINE 1 MG/1
1 TABLET ORAL
Qty: 90 TABLET | Refills: 3 | Status: SHIPPED | OUTPATIENT
Start: 2021-06-03

## 2021-06-03 ASSESSMENT — ENCOUNTER SYMPTOMS
FALLS: 0
INSOMNIA: 0
TREMORS: 1
MEMORY LOSS: 0
WEAKNESS: 0
CONSTIPATION: 0

## 2021-06-03 NOTE — PROGRESS NOTES
"Subjective:      Pantera Parker is a 61 y.o. male who presents for annual follow-up, with a history of mild right-sided Parkinson's Disease.    HPI    Pantera states that over the last year he has continued to do fairly well. He is still on Rytary 3 times a day with his Azilect 1 mg every morning. He has begun to notice some tremulousness with the left hand, though it is still mostly the right that gets involved. This is more of an issue when he is distracted. There has been some loss of dexterity on both sides as a result. From day today he does notice a \"swimming pool\" affect with general movements, but if he relaxes himself, he does well, gets \"out of the pool\", and moves easily. There may be a slight fluctuation with good and bad days, though the difference is so subtle, it is certainly not more than a distraction.    Cognition is intact. He sleeps well, in fact he may nap towards the afternoon briefly. Voice volume is maintained, he does not drool, swallowing is normal. There can occasionally be some dyskinetic movement with the right arm, this is rare, there is no real postural instability. The right arm swing can be diminished, he makes it a point of swinging when this happens. Stride length is maintained. He is not shuffling or tripping.    He no issues with constipation. There are no urinary changes. He denies problems with orthostasis, early satiety with nausea vomiting, etc.    Medical, surgical and family histories are reviewed, there are no new drug allergies. Is on Rytary 36.25/145, 1 tablet 3 times a day, Azilect 1 mg daily, baby aspirin and Crestor.    Review of Systems   Gastrointestinal: Negative for constipation.   Musculoskeletal: Negative for falls.   Neurological: Positive for tremors. Negative for weakness.   Psychiatric/Behavioral: Negative for memory loss. The patient does not have insomnia.    All other systems reviewed and are negative.       Objective:     /80 (BP Location: " "Right arm, Patient Position: Sitting)   Pulse 74   Temp 36.6 °C (97.9 °F) (Temporal)   Ht 1.803 m (5' 11\")   Wt 68.9 kg (151 lb 14.4 oz)   SpO2 96%   BMI 21.19 kg/m²      Physical Exam    He appears in no acute distress. His vital signs are stable. There is no malar rash or sialorrhea. The neck is supple, range of motion is full. Cardiac evaluation reveals a regular rhythm.    Neurological Exam    He is fully oriented, there is no aphasia, apraxia, or inattention.    PERRLA/EOMI, there is mild hypophonia, no worse than usual, eye blink frequency still minimally diminished. There is no dysarthria, facial movements are symmetric, visual fields are full. There is no bulbar dysfunction.    Musculoskeletal exam does reveal the tremor with the right hand, but now to a slight degree the left thumb when he is distracted. Rigidity is slight on the left, on the right slightly more profound. Bradykinesia is minimal throughout. There is no asterixis or drift. Strength is intact in all 4 extremities. Elbow extension is good.    He stands easily, without assistance. Armswing on the right is diminished when compared to the left, he still requires only 3 steps to turn in either direction, doing so easily. Stride length is maintained bilaterally. He is not stooped. There is no appendicular dystaxia with any extremity, movement simply slower with the right upper and lower extremity. Repetitive movements are more diminished in amplitude with the right hand and foot when compared to the left.    Sensory exam is intact to light touch.     Assessment/Plan:     1. Parkinson disease (HCC)  He continues to do well, there may be a slight progression over the last year, but this is all. We talked about signs that would suggest a need to increase medication, he is not even close to this happening. We are talking about significant of tremor, loss of dexterity and functional quality, postural instabilities, major fluctuations, etc. " Fortunately, he has no peak-dose or wearing-off symptomatology at this time. He knows to take his Rytary 30 minutes before a meal, though given how mild his disease is, I doubt we would see much of a difference if he were to take his pills with food. He and I can follow-up in 1 year.    - Carbidopa-Levodopa ER (RYTARY) 36. MG Cap CR; Take 1 tablet by mouth 3 times a day.  Dispense: 270 capsule; Refill: 3  - rasagiline (AZILECT) 1 MG Tab; Take 1 tablet by mouth every day.  Dispense: 90 tablet; Refill: 3    Time: 25 minutes spent face-to-face for exam, review, discussion, and education, of this over 60% of the time spent counseling and coordinating care.

## 2021-10-28 DIAGNOSIS — G20.A1 PARKINSON DISEASE (HCC): ICD-10-CM

## 2021-11-04 NOTE — ED NOTES
Med Rec completed per patient  Allergies reviewed  No ORAL antibiotics in last 30 days         Statement Selected

## 2022-04-24 ENCOUNTER — OFFICE VISIT (OUTPATIENT)
Dept: URGENT CARE | Facility: CLINIC | Age: 62
End: 2022-04-24

## 2022-04-24 ENCOUNTER — HOSPITAL ENCOUNTER (OUTPATIENT)
Dept: RADIOLOGY | Facility: MEDICAL CENTER | Age: 62
End: 2022-04-24
Attending: PHYSICIAN ASSISTANT
Payer: COMMERCIAL

## 2022-04-24 VITALS
HEART RATE: 90 BPM | BODY MASS INDEX: 21.28 KG/M2 | OXYGEN SATURATION: 98 % | RESPIRATION RATE: 18 BRPM | HEIGHT: 71 IN | TEMPERATURE: 97.7 F | SYSTOLIC BLOOD PRESSURE: 132 MMHG | WEIGHT: 152 LBS | DIASTOLIC BLOOD PRESSURE: 76 MMHG

## 2022-04-24 DIAGNOSIS — M79.661 RIGHT CALF PAIN: ICD-10-CM

## 2022-04-24 DIAGNOSIS — R60.0 EDEMA OF RIGHT LOWER EXTREMITY: ICD-10-CM

## 2022-04-24 DIAGNOSIS — T14.8XXA SUBCUTANEOUS HEMATOMA: ICD-10-CM

## 2022-04-24 PROCEDURE — 99214 OFFICE O/P EST MOD 30 MIN: CPT | Performed by: PHYSICIAN ASSISTANT

## 2022-04-24 PROCEDURE — 93971 EXTREMITY STUDY: CPT | Mod: RT

## 2022-04-24 RX ORDER — LEVODOPA AND CARBIDOPA 145; 36.25 MG/1; MG/1
1 CAPSULE, EXTENDED RELEASE ORAL
COMMUNITY
Start: 2022-01-07

## 2022-04-24 NOTE — PROGRESS NOTES
"Subjective:   Pantera Parker is a 62 y.o. male who presents for Knee Pain (April 15th, went to [facility] and did an x-ray of knee, did not find anything at that time, has been going on since 4/15/22, No known injury, discoloration in calf, bruising on foot, swelling in knee, )      HPI  Patient is a 62-year-old male here in the clinic with complaints of right lower leg swelling, bruising, and tenderness.  Symptoms began with right knee pain approximately 2 weeks ago.  Denies any known injury or trauma.  He presented to the [facility] clinic 4 days ago and x-ray was performed which was negative.  It was suspected to be a popliteal cyst that may have burst or calf strain.  He presents today out of concern with possible blood clot.  Denies a history of blood clots.  Denies any history or family history of clotting disorder.  Denies any numbness or tingling. No recent hospitalizations.  Patient not taking any blood thinners.      Medications:    • aspirin  • nitroglycerin Subl  • rasagiline Tabs  • rosuvastatin Tabs  • Rytary Cpcr    Allergies: Lipitor [atorvastatin]    Problem List: Pantera Parker does not have any pertinent problems on file.    Surgical History:  Past Surgical History:   Procedure Laterality Date   • Z CARDIAC CATH  11/30/2018    Stent to 90% LAD,has 90% OM1 that needs intervention.   • HERNIA REPAIR Bilateral     age 24 months   • VASECTOMY Bilateral     1997       Past Social Hx: Pantera Parker  reports that he has never smoked. He has never used smokeless tobacco. He reports current alcohol use of about 0.6 oz of alcohol per week. He reports that he does not use drugs.     Past Family Hx:  Pantera Parker family history includes Cancer in his father; GI Disease in his mother.     Problem list, medications, and allergies reviewed by myself today in Epic.     Objective:     /76   Pulse 90   Temp 36.5 °C (97.7 °F) (Temporal)   Resp 18   Ht 1.803 m (5' 11\")   Wt 68.9 " kg (152 lb)   SpO2 98%   BMI 21.20 kg/m²     Physical Exam  Vitals reviewed.   Constitutional:       General: He is not in acute distress.     Appearance: Normal appearance. He is not ill-appearing or toxic-appearing.   Eyes:      Conjunctiva/sclera: Conjunctivae normal.      Pupils: Pupils are equal, round, and reactive to light.   Cardiovascular:      Rate and Rhythm: Normal rate.   Pulmonary:      Effort: Pulmonary effort is normal.   Musculoskeletal:      Cervical back: Neck supple.      Comments: Right lower extremity:  Right knee Full ROM. Negative instability or laxity. He has some very mild medial joint line tenderness.  He has a negative Ant sign.  He has some tenderness over the proximal medial gastrocnemius. Diffuse mild non-pitting edema to ankle and medial foot with what appears to be resolving yellow/ light blue hue ecchymosis.   Right ankle with Full ROM. Dorsalis pedis and posterior tibial pulse 2+. Negative pallor. Negative erythema. Sensation intact. Capillary refill < 2 seconds.    Neurological:      General: No focal deficit present.      Mental Status: He is alert and oriented to person, place, and time.   Psychiatric:         Mood and Affect: Mood normal.         Behavior: Behavior normal.         RADIOLOGY RESULTS     US-EXTREMITY VENOUS LOWER UNILAT RIGHT    Result Date: 4/24/2022 4/24/2022 10:52 AM HISTORY/REASON FOR EXAM:  Pain in Limb with Pressure; mild edema. Right lower extremity edema TECHNIQUE/EXAM DESCRIPTION: Using both color and pulsed-wave Doppler imaging, multiple images were obtained from the right common femoral vein origin distally through the popliteal trifurcation.  Graded compression was used to demonstrate a patent lumen. COMPARISON:  None. FINDINGS: There is no evidence of luminal thrombus.  There is normal augmentation to flow and respiratory variation. There is a 7.2 x 2.7 x 1.2 cm subcutaneous hematoma in the right medial calf.     1. No evidence of right lower  extremity deep venous thrombosis.   2. There is a 7.2 x 2.7 x 1.2 cm subcutaneous hematoma in the right medial calf.       Diagnosis and associated orders:     1. Edema of right lower extremity    - US-EXTREMITY VENOUS LOWER UNILAT RIGHT; Future    2. Right calf pain      3. Subcutaneous hematoma         Comments/MDM:     • I agree with JANIE provider in which this still could be ruptured popliteal cyst versus calf strain.  However, DVT is in the differential which I have low suspicion.  We will rule out DVT with ultrasound.   • US results per radiologist interpretation above. No DVT. However, 7.2 x 2.7 x 1.2 cm subcutaneous hematoma of the right medial calf.    • Recommend rest, elevation, ice application and then heat application, compression stocking, Tylenol.  • Recommend following up with JANIE or PCP in the next few days for recheck, reevaluation, and consideration of further management.     I personally reviewed prior external notes and test results pertinent to today's visit. Red flags discussed. Supportive care, natural history, differential diagnoses, and indications for immediate follow-up discussed. Patient expresses understanding and agrees to plan. Patient denies any other questions or concerns.     Follow-up with the primary care physician for recheck, reevaluation, and consideration of further management.    Please note that this dictation was created using voice recognition software. I have made a reasonable attempt to correct obvious errors, but I expect that there are errors of grammar and possibly content that I did not discover before finalizing the note.    This note was electronically signed by Marcin Crook PA-C

## 2024-03-25 NOTE — ASSESSMENT & PLAN NOTE
Concerning for angina  Non specific EKG - Lateral leads ST depressions, reviewed by me   CXRAY no mediastinal widening  Echo stable with EF 55%  Troponin negative.  VSS.  Pending angiogram.  Continue ASA  Telemetry monitoring  Cardiology following.     HCC coding opportunities       Chart reviewed, no opportunity found: CHART REVIEWED, NO OPPORTUNITY FOUND        Patients Insurance     Medicare Insurance: Medicare

## 2024-12-23 ENCOUNTER — TELEPHONE (OUTPATIENT)
Dept: NEUROLOGY | Facility: MEDICAL CENTER | Age: 64
End: 2024-12-23
Payer: COMMERCIAL

## 2024-12-23 NOTE — TELEPHONE ENCOUNTER
NEUROLOGY PATIENT PRE-VISIT PLANNING     Patient was NOT contacted to complete PVP.  Note: Patient will not be contacted if there is no indication to call.     Patient Appointment is scheduled as: New Patient     Is visit type and length scheduled correctly? Yes    [employer] Patient is checked in Patient Demographics? Yes    3.   Is referral attached to visit? Yes    4. Were records received from referring provider? Yes    4. Patient was NOT contacted to have someone accompany them to visit.     5. Is this appointment scheduled as a Hospital Follow-Up?  No    6. Does the patient require any pre procedure or post procedure follow up? No    7. If any orders were placed at last visit or intended to be done for this visit do we have Results/Consult Notes? No  Labs - Labs were not ordered at last office visit.  Imaging - Imaging was not ordered at last office visit.  Referrals - No referrals were ordered at last office visit.  Note: If patient appointment is for lab or imaging review and patient did not complete the studies, check with provider if OK to reschedule patient until completed.    8. If patient appointment is for Botox - is order pended for provider? N/A    9. Was Plan Assessment from last Neurology Office Visit Reviewed?  Yes

## 2024-12-30 ENCOUNTER — OFFICE VISIT (OUTPATIENT)
Dept: NEUROLOGY | Facility: MEDICAL CENTER | Age: 64
End: 2024-12-30
Attending: INTERNAL MEDICINE
Payer: COMMERCIAL

## 2024-12-30 VITALS
SYSTOLIC BLOOD PRESSURE: 112 MMHG | WEIGHT: 143.52 LBS | TEMPERATURE: 97.2 F | RESPIRATION RATE: 18 BRPM | DIASTOLIC BLOOD PRESSURE: 68 MMHG | HEIGHT: 71 IN | HEART RATE: 87 BPM | OXYGEN SATURATION: 97 % | BODY MASS INDEX: 20.09 KG/M2

## 2024-12-30 DIAGNOSIS — G20.A1 PARKINSON DISEASE (HCC): ICD-10-CM

## 2024-12-30 DIAGNOSIS — G20.B2 PARKINSON'S DISEASE WITH DYSKINESIA AND FLUCTUATING MANIFESTATIONS (HCC): ICD-10-CM

## 2024-12-30 DIAGNOSIS — G47.52 REM SLEEP BEHAVIOR DISORDER: ICD-10-CM

## 2024-12-30 RX ORDER — CLONAZEPAM 0.5 MG/1
0.5 TABLET ORAL NIGHTLY
Qty: 90 TABLET | Refills: 1 | Status: SHIPPED | OUTPATIENT
Start: 2024-12-30 | End: 2025-06-28

## 2024-12-30 RX ORDER — RASAGILINE 1 MG/1
1 TABLET ORAL
Qty: 90 TABLET | Refills: 3 | Status: SHIPPED | OUTPATIENT
Start: 2024-12-30

## 2024-12-30 RX ORDER — LEVODOPA AND CARBIDOPA 245; 61.25 MG/1; MG/1
2 CAPSULE, EXTENDED RELEASE ORAL 4 TIMES DAILY
Qty: 720 CAPSULE | Refills: 3 | Status: SHIPPED | OUTPATIENT
Start: 2024-12-30 | End: 2025-12-30

## 2024-12-30 RX ORDER — DOCUSATE SODIUM 100 MG/1
100 CAPSULE, LIQUID FILLED ORAL 2 TIMES DAILY
COMMUNITY

## 2024-12-30 RX ORDER — CLONAZEPAM 0.5 MG/1
1 TABLET ORAL NIGHTLY
COMMUNITY
End: 2024-12-30 | Stop reason: SDUPTHER

## 2024-12-30 RX ORDER — ROSUVASTATIN CALCIUM 10 MG/1
10 TABLET, COATED ORAL EVERY EVENING
COMMUNITY

## 2024-12-30 ASSESSMENT — PATIENT HEALTH QUESTIONNAIRE - PHQ9: CLINICAL INTERPRETATION OF PHQ2 SCORE: 0

## 2024-12-30 NOTE — PATIENT INSTRUCTIONS
PARKINSON SUPPORT [facility]ENTER [facility]  [email or web address]  [email or web address]  [phone]     Informational Resources  Josh Plango - www.[employer].org  Tereso B[employer] Foundation - www.davisphinneyfoundation.org  National Parkinsons Foundation - www.parkinson.org  Kana Segundo Foundation - www.braingrant.org    Therapy  LSVT BIG (Physical) and LOUD (Speech) Therapy: www.lsvtglobal.com    Tools  [employer] - make-up line designed by a person with PD for easier use  Liftware - utensils for use with tremors     NUSHU walking shoes for Parkinson's: www.[employer]/shop/nushu-x    Online exercise classes  med.[place].[facility]/parkinsons/living-with-PD/exercise-videos.html    A selected few from that list:  www.parkinsonseurope.org/exercisecast/  videos.aarp.org/search?q=exercise  www.[employer]/videos/  www.[employer]/c/APDAGreaterStLouisChapter  www.SecureAlert/youtube     [employer]  www.Infer/vipul   [phone]  [email or web address]  [facility]  [address], [place]  11:00 am, Monday - Friday Monday/Wednesday/Friday classes: intermediate pace/level   Tuesday/Thursday: beginner/slower paced level    www.INVERMARTingscoo mobilityub.[employer]  [place] [phone], [place] [phone]  [employer] [facility]  [address], [facility] Steady Boxing - Tuesday/Thursday at 3:00pm-4:00pm  Low Impact/Seated Boxing - Tues/Thurs at 2:00pm-2:50pm   [facility]  [address] [place] [address] Steady Boxing - Monday/Wednesday/Friday at 3:00pm-4:00pm  Senior Work Out - Mon/Fri at 4:00pm-5:00pm

## 2024-12-30 NOTE — PROGRESS NOTES
Aurelio Forman,   Neurology, Movement Disorders [facility]  [address] [facility], [address]. [place] [address]  Phone: [phone], Fax: [phone]     ASSESSMENT / PLAN   Pantera Parker is a 64 y.o. LHD male presenting for parkinson's disease    Parkinson's disease  Levodopa induced dyskinesias  Symptom onset 2017 with right hand tremor.  Has been levodopa responsive.  Exam notable for mild left arm postural and intention tremor.  Right leg dyskinesia.    Deep brain stimulation discussed  He would like to hold off on amantadine and/or Gocovri as dyskinesias are not too bothersome.  PSCNN discussed    - Rytary 245 mg, 2 capsules 4 times a day  - Rasagiline 1 mg daily    REM sleep behavior disorder  Rare episodes, but benefiting from clonazepam for insomnia  - clonazepam 0.5mg nightly, consider 0.5 - 1 tab    Constipation  - Colace 2x/day    Vit D deficiency  - continue supplementation    Anxiety  - stable      Orders Placed This Encounter    rosuvastatin (CRESTOR) 10 MG Tab    DISCONTD: clonazePAM (KLONOPIN) 0.5 MG Tab    docusate sodium (COLACE) 100 MG Cap    Carbidopa-Levodopa ER (RYTARY) 61. MG Cap CR    rasagiline (AZILECT) 1 MG Tab    clonazePAM (KLONOPIN) 0.5 MG Tab     Return in about 3 months (around 3/30/2025).    BILLING DOCUMENTATION:   Excluding time spent on procedures during visit, I spent  83  minutes reviewing the medical record, interviewing and examining the patient, discussing diagnosis and treatment, and coordinating care.              HISTORY OF PRESENT ILLNESS   Pantera Parker is a 64 y.o. LHD male presenting for parkinson's disease    Initial HPI 12/30/24  Previously seen Dr. Ramachandran, [facility] with Dr. Chavez, Dr. Batista, Dr. Ruffin, Dr. Villarreal (notes scanned in media). Last visit 8/2024.    Onset: 2017 with tremor in the right hand.    Neuroleptics/pesticide exposure: TCE exposure in the 1970s at his office  Family history: 2nd cousin with tremors,  possible PD  Imaging: none  TSH: ---  [facility] 7/2024: CBC, CMP unremarkable. Vit D: 24 (LOW)    Previous medication:  - Propranolol: No benefit  - Rasagiline 1 mg: started in 2017  - Rytary 145mg: started in 2020      Current Regimen  Rytary 245mg, 2 cap, 4x/day (7AM, 11AM, 3PM, 7PM)  Rasagiline 1 mg  Latency:  <45min  Duration: no wearing off. Lasts approx 4-6 hours  Efficacy: tremors better. Some left leg tremors  Dyskinesia/Dystonia: left arm dyskinesias, since resolved after Rytary adjusted  Sfx: none      ROS  Gait:  Some impairment. Sometimes catches foot. No falls  PT: none  Exercise: recumbent bicycle    Orthostasis:   No issues    GI:   +constipation: colase BID helps    :   No issues    Speech/Swallow:   No dysphagia aside from peanuts  +hypophonia: more raspy. More thick saliva    Cognition:   No issues    Mood:   +anxiety: affects his sleep in past. Had to stop video games since it affected his sleep.   No depression.    Hallucinations:   No issues    Sleep/RBD:   +RBD: a few times a year. Yelling. Got up out of bed. Clonazepam unclear if reduced frequency  Insomnia: falls asleep OK, but wakes up and can't fall back asleep. Wakes up 1-2 times a night. Clonazepam 0.5mg helped. Feels refreshed in AM.    Autonomics:  Night sweats, since resolved. Only 1 episode recently.        Past Medical History:   Diagnosis Date    CAD (coronary artery disease)     High cholesterol      Past Surgical History:   Procedure Laterality Date    [facility] CARDIAC CATH  11/30/2018    Stent to 90% LAD,has 90% OM1 that needs intervention.    HERNIA REPAIR Bilateral     age 24 months    VASECTOMY Bilateral     1997     Family History   Problem Relation Age of Onset    GI Disease Mother     Cancer Father      Social History     Socioeconomic History    Marital status:      Spouse name: Not on file    Number of children: Not on file    Years of education: Not on file    Highest education level: Not on file   Occupational History     Not on file   Tobacco Use    Smoking status: Never    Smokeless tobacco: Never   Vaping Use    Vaping status: Never Used   Substance and Sexual Activity    Alcohol use: Yes     Alcohol/week: 0.6 oz     Types: 1 Shots of liquor per week     Comment: rare    Drug use: No    Sexual activity: Yes     Partners: Female     Birth control/protection: Surgical   Other Topics Concern    Not on file   Social History Narrative    Not on file     Social Drivers of Health     Financial Resource Strain: Not on file   Food Insecurity: Not on file   Transportation Needs: Not on file   Physical Activity: Not on file   Stress: Not on file   Social Connections: Not on file   Intimate Partner Violence: Not on file   Housing Stability: Not on file     Current Outpatient Medications   Medication    rosuvastatin (CRESTOR) 10 MG Tab    docusate sodium (COLACE) 100 MG Cap    Carbidopa-Levodopa ER (RYTARY) 61. MG Cap CR    rasagiline (AZILECT) 1 MG Tab    clonazePAM (KLONOPIN) 0.5 MG Tab    aspirin 81 MG EC tablet    nitroglycerin (NITROSTAT) 0.4 MG SL Tab    rosuvastatin (CRESTOR) 5 MG Tab     No current facility-administered medications for this visit.     Allergies   Allergen Reactions    Lipitor [Atorvastatin]      Arm stiffness             DATA / RESULTS     25-Hydroxy   Vitamin D 25   Date Value Ref Range Status   05/12/2016 17.5 (L) 30.0 - 100.0 ng/mL Final     Comment:     Vitamin D deficiency has been defined by the [place] of  Medicine and an Endocrine Society practice guideline as a  level of serum 25-OH vitamin D less than 20 ng/mL (1,2).  The Endocrine Society went on to further define vitamin D  insufficiency as a level between 21 and 29 ng/mL (2).  1. IOM ([place] of Medicine). 2010. Dietary reference  intakes for calcium and D. [place]: The  National Academies Press.  2. Itz RENO, Alisha EPSTEIN, Audra VALADEZ, et al.  Evaluation, treatment, and prevention of vitamin D  deficiency: an Endocrine Society  "clinical practice  guideline. JCEM. 2011 Jul; 96(7):1911-30.  A courtesy copy of this report has been sent to  the patient.       TSH   Date Value Ref Range Status   11/30/2018 1.220 0.380 - 5.330 uIU/mL Final     Comment:     Please note new reference ranges effective 12/14/2017 10:00 AM  Pregnant Females, 1st Trimester  0.050-3.700  Pregnant Females, 2nd Trimester  0.310-4.350  Pregnant Females, 3rd Trimester  0.410-5.180       Glycohemoglobin   Date Value Ref Range Status   01/21/2020 5.7 (A) 0.0 - 5.6 % Final     LDL   Date Value Ref Range Status   01/21/2020 46 0 - 99 mg/dL Final                OBJECTIVE      Vitals:    12/30/24 0852   BP: 112/68   BP Location: Left arm   Patient Position: Sitting   BP Cuff Size: Adult   Pulse: 87   Resp: 18   Temp: 36.2 °C (97.2 °F)   TempSrc: Temporal   SpO2: 97%   Weight: 65.1 kg (143 lb 8.3 oz)   Height: 1.803 m (5' 11\")     Physical Exam    Last dose of C/L taken 7AM (2 hours ago)     General: NAD, appears stated age.      Mental status: Speech clear and fluent without tremor. Fund of knowledge is good.      Cranial Nerves:  CN2: PERRL. Visual fields are full to finger confrontation.   CN3/4/6: EOMI. There is no nystagmus   CN5: V1-V3 intact to light touch    CN7: Symmetric face  CN8: Hearing grossly intact  CN9/10/12: Soft palate and uvula rise symmetrically. Tongue midline  CN11: Shoulder shrug intact bilaterally    Strength  Right Left   Shoulder Abduction  5 5   Elbow Flexion 5 5   Elbow Extension  5 5   Wrist Extension  5 5   Finger Extension  5 5   Hip Flexion  5 5   Knee Extension 5 5   Ankle Dorsiflexion  5 5     Deep Tendon Reflexes: 1+ biceps, brachioradialis, patella and 0 ankles.     Abnormal movements:    UPDRS Right Left   Finger tapping 0 0   Hand Movement 0 0   Toe Tapping 0 0   Leg Agility 0 0   Rigidity 0 0   Rest Tremor 0 0   Postural Tremor 0 1   Kinetic Tremor 1 1      +right leg dyskinesias, intermittent    No dystonia, tics, stereotypies, athetosis, " akathisia, or chorea noted.      Sensory:   Quantitative tuning fork Right Left   Hands 8 8   Feet 6 6       Cerebellar: No dysmetria with FTN or heel-to-shin.    Gait:   Posture - normal   Base - narrow   Stride length - normal   Arm swing - decreased right arm swing   Speed - normal  Shuffling/freezing - none  U-Turn - normal           PROCEDURE     N/A

## 2024-12-31 ENCOUNTER — PATIENT MESSAGE (OUTPATIENT)
Dept: NEUROLOGY | Facility: MEDICAL CENTER | Age: 64
End: 2024-12-31
Payer: COMMERCIAL

## 2024-12-31 DIAGNOSIS — G20.B2 PARKINSON'S DISEASE WITH DYSKINESIA AND FLUCTUATING MANIFESTATIONS (HCC): ICD-10-CM

## 2025-01-23 ENCOUNTER — TELEPHONE (OUTPATIENT)
Dept: NEUROLOGY | Facility: MEDICAL CENTER | Age: 65
End: 2025-01-23
Payer: COMMERCIAL

## 2025-01-23 DIAGNOSIS — G20.B2 PARKINSON'S DISEASE WITH DYSKINESIA AND FLUCTUATING MANIFESTATIONS (HCC): ICD-10-CM

## 2025-01-23 NOTE — TELEPHONE ENCOUNTER
Our referral dept called stating pt called them because Dr. Forman referred them to [facility] for Neurosurgery but [facility] told them Dr. Forman needs to send another referral to them for Neurology since this is for a depep stimulation.  She is aware I will send a message to Dr. Forman as high priority to notifying him of this

## 2025-01-29 ENCOUNTER — OFFICE VISIT (OUTPATIENT)
Dept: NEUROLOGY | Facility: MEDICAL CENTER | Age: 65
End: 2025-01-29
Attending: INTERNAL MEDICINE
Payer: COMMERCIAL

## 2025-01-29 VITALS
SYSTOLIC BLOOD PRESSURE: 124 MMHG | DIASTOLIC BLOOD PRESSURE: 62 MMHG | HEIGHT: 71 IN | BODY MASS INDEX: 19.75 KG/M2 | HEART RATE: 74 BPM | OXYGEN SATURATION: 97 % | TEMPERATURE: 96.9 F | WEIGHT: 141.09 LBS

## 2025-01-29 DIAGNOSIS — G20.B2 PARKINSON'S DISEASE WITH DYSKINESIA AND FLUCTUATING MANIFESTATIONS (HCC): ICD-10-CM

## 2025-01-29 ASSESSMENT — PATIENT HEALTH QUESTIONNAIRE - PHQ9: CLINICAL INTERPRETATION OF PHQ2 SCORE: 0

## 2025-01-29 NOTE — PATIENT INSTRUCTIONS
- try Rytary at bedtime (1-2 capsules) to see if it helps with sleep quality  - if benefiting from Rytary at bedtime, then try lowering clonazepam dose

## 2025-01-29 NOTE — PROGRESS NOTES
Aurelio Forman,   Neurology, Movement Disorders [facility]  [address], [address]. [place] [address]  Phone: [phone], Fax: [phone]     ASSESSMENT / PLAN   Pantera Parker is a 64 y.o. LHD male presenting for parkinson's disease    Parkinson's disease  Levodopa induced dyskinesias  Symptom onset 2017 with right hand tremor.  Has been levodopa responsive.  Exam notable for mild left arm postural and intention tremor.  Right leg dyskinesia.    He would like to hold off on amantadine and/or Gocovri as dyskinesias are not too bothersome.  [facility]  ON/OFF testing 01/29/25: UPDRS Part 3 score Off = 23. On = 4.    - [facility] neurosurgery and neurology. Pending appt 5/2025  - NPT referral sent, on hold until appointment with [facility]  - Rytary 245 mg, 2 capsules 4 times a day  - Rasagiline 1 mg daily    REM sleep behavior disorder  Sleep maintenance insomnia  Rare episodes of RBD, but benefiting from clonazepam for insomnia  - clonazepam 0.5mg nightly, consider 0.5 - 1 tab  - try Rytary 1-2 capsules at bedtime in case OFF-symptoms are triggering nighttime awakening    Constipation  - Colace 2x/day    Vit D deficiency  - continue supplementation    Anxiety  - stable      No orders of the defined types were placed in this encounter.    Return in about 4 months (around 5/29/2025).    BILLING DOCUMENTATION:   Excluding time spent on procedures during visit, I spent 40 minutes reviewing the medical record, interviewing and examining the patient, discussing diagnosis and treatment, and coordinating care.              HISTORY OF PRESENT ILLNESS   Pantera Parker is a 64 y.o. LHD male presenting for parkinson's disease    Initial HPI 12/30/24  Previously seen Dr. Ramachandran, [facility] with Dr. Chavez, Dr. Batista, Dr. Ruffin, Dr. Villarreal (notes scanned in media). Last visit 8/2024.    Onset: 2017 with tremor in the right hand.    Neuroleptics/pesticide exposure: TCE exposure in the  1970s at his office  Family history: 2nd cousin with tremors, possible PD  Imaging: none  TSH: ---  [facility] 7/2024: CBC, CMP unremarkable. Vit D: 24 (LOW)    Previous medication:  - Propranolol: No benefit  - Rasagiline 1 mg: started in 2017  - Rytary 145mg: started in 2020      Interval history  12/30/24: first visit with me. No med changes. Referral for DBS at [facility].  01/29/25: trial Rytary at bedtime      Current Regimen  Rytary 245mg, 2 at 6AM, 3 at 11AM, 3 at 5PM  Rasagiline 1 mg  Latency:  <45min  Duration:   - no wearing off. Lasts approx 4-6 hours.   - 01/29/25: 6AM dose sometimes wears off before 6AM  Efficacy:   - tremors better. Some left leg tremors. 3 capsules more effective.   - 01/29/25: stopping rasagline on 1/14/25, noted that 10 days may have needed to increase Rytary to 3 capsules.   Dyskinesia/Dystonia:   - left arm dyskinesias, since resolved after Rytary adjusted. No dyskinesias with 3 capsules.   Sfx: none      ROS  Gait:  Some impairment. Sometimes catches foot. No falls  PT: none  Exercise: recumbent bicycle    Orthostasis:   No issues    GI:   +constipation: colase BID helps    :   No issues    Speech/Swallow:   No dysphagia aside from peanuts  +hypophonia: more raspy. More thick saliva    Cognition:   No issues    Mood:   +anxiety: affects his sleep in past. Had to stop video games since it affected his sleep.   No depression    Hallucinations:   No issues    Sleep/RBD:   +RBD: a few times a year. Yelling. Got up out of bed. Clonazepam unclear if reduced frequency  Insomnia: falls asleep OK, but wakes up and can't fall back asleep. Wakes up 1-2 times a night. Clonazepam 0.5mg helped. Feels refreshed in AM.    Autonomics:  Night sweats, since resolved. Only 1 episode recently.        Past Medical History:   Diagnosis Date    CAD (coronary artery disease)     High cholesterol      Past Surgical History:   Procedure Laterality Date    [facility] CARDIAC CATH  11/30/2018    Stent to 90% LAD,has 90%  OM1 that needs intervention.    HERNIA REPAIR Bilateral     age 24 months    VASECTOMY Bilateral     1997     Family History   Problem Relation Age of Onset    GI Disease Mother     Cancer Father      Social History     Socioeconomic History    Marital status:      Spouse name: Not on file    Number of children: Not on file    Years of education: Not on file    Highest education level: Not on file   Occupational History    Not on file   Tobacco Use    Smoking status: Never    Smokeless tobacco: Never   Vaping Use    Vaping status: Never Used   Substance and Sexual Activity    Alcohol use: Yes     Alcohol/week: 0.6 oz     Types: 1 Shots of liquor per week     Comment: rare    Drug use: No    Sexual activity: Yes     Partners: Female     Birth control/protection: Surgical   Other Topics Concern    Not on file   Social History Narrative    Not on file     Social Drivers of Health     Financial Resource Strain: Not on file   Food Insecurity: Not on file   Transportation Needs: Not on file   Physical Activity: Not on file   Stress: Not on file   Social Connections: Not on file   Intimate Partner Violence: Not on file   Housing Stability: Not on file     Current Outpatient Medications   Medication    rosuvastatin (CRESTOR) 10 MG Tab    docusate sodium (COLACE) 100 MG Cap    Carbidopa-Levodopa ER (RYTARY) 61. MG Cap CR    rasagiline (AZILECT) 1 MG Tab    clonazePAM (KLONOPIN) 0.5 MG Tab    aspirin 81 MG EC tablet    nitroglycerin (NITROSTAT) 0.4 MG SL Tab     No current facility-administered medications for this visit.     Allergies   Allergen Reactions    Lipitor [Atorvastatin]      Arm stiffness             DATA / RESULTS     25-Hydroxy   Vitamin D 25   Date Value Ref Range Status   05/12/2016 17.5 (L) 30.0 - 100.0 ng/mL Final     Comment:     Vitamin D deficiency has been defined by the [place] of  Medicine and an Endocrine Society practice guideline as a  level of serum 25-OH vitamin D less than 20 ng/mL  "(1,2).  The Endocrine Society went on to further define vitamin D  insufficiency as a level between 21 and 29 ng/mL (2).  1. IOM ([place] of Medicine). 2010. Dietary reference  intakes for calcium and D. [place]: The  National Academies Press.  2. Itz MF, Alisha EPSTEIN, Audra VALADEZ, et al.  Evaluation, treatment, and prevention of vitamin D  deficiency: an Endocrine Society clinical practice  guideline. JCEM. 2011 Jul; 96(0):1911-30.  A courtesy copy of this report has been sent to  the patient.       TSH   Date Value Ref Range Status   11/30/2018 1.220 0.380 - 5.330 uIU/mL Final     Comment:     Please note new reference ranges effective 12/14/2017 10:00 AM  Pregnant Females, 1st Trimester  0.050-3.700  Pregnant Females, 2nd Trimester  0.310-4.350  Pregnant Females, 3rd Trimester  0.410-5.180       Glycohemoglobin   Date Value Ref Range Status   01/21/2020 5.7 (A) 0.0 - 5.6 % Final     LDL   Date Value Ref Range Status   01/21/2020 46 0 - 99 mg/dL Final                OBJECTIVE      Vitals:    01/29/25 0715   BP: 124/62   BP Location: Left arm   Patient Position: Sitting   BP Cuff Size: Adult   Pulse: 74   Temp: 36.1 °C (96.9 °F)   TempSrc: Temporal   SpO2: 97%   Weight: 64 kg (141 lb 1.5 oz)   Height: 1.803 m (5' 11\")       UNIFIED PARKINSON'S DISEASE RATING SCALE (UPDRS)  PART III: MOTOR EXAMINATION    Last levodopa dose: Rytary 5:30PM last night  Time of exam: 7:31AM  Patient's clinical state is: OFF    Speech:  1: Slight, loss of modulation, diction, or volume, but still all words easy to understand   Facial expression:  2: Mild - In addition to decreased eye-blink frequency, masked facies present in the lower face, but lips not parted.       Rigidity  Neck: 2: Mild - rigidity detected without the activation maneuver, but full range of motion is easily achieved.    RUE: 1: Slight - rigidity only detected with the activation maneuver     LUE: 1: Slight - rigidity only detected with the activation " maneuver     RLE: 1: Slight - rigidity only detected with the activation maneuver     LLE: 0: Normal - no rigidity       Finger tapping  Right: 0: Normal - no problems.    Left: 0: Normal - no problems.   Hand movements Right: 0: Normal - no problems     Left: 0: Normal - no problems   Pronation-supination movements of hands  Right: 0: Normal - no problems     Left: 0: Normal - no problems   Toe tapping  Right: 0: Normal - no problems    Left: 0: Normal - no problems   Leg agility Right: 0: Normal - no problems.     Left: 0: Normal - no problems.        Arising from chair: 0: Normal - no problems. Able to arise quickly without hesitation.   Gait:  1: Slight - Independent walking with minor gait impairment.   Freezing of gait:  0: Normal - no freezing.   Postural stability:  0: Normal - No problems. Recovers with one or two steps.   Posture: 0: Normal - No problems.    Body bradykinesia:  1: Slight - Slight global slowness and poverty of spontaneous movements.       Postural tremor of the hands Right: 0: Normal - No tremor.     Left: 1: Slight - Tremor is present but less than 1 cm in amplitude.       Kinetic tremor of the hands  Right: 0: Normal - No tremor.     Left: 1: Slight - Tremor is present but less than 1 cm in amplitude.       Rest tremor amplitude  Lip/jaw: 0: Normal - No tremor.     RUE: 1: Slight: < 1 cm in maximal amplitude.     LUE: 2: Mild: > 1 cm but < 3 cm in maximal amplitude.     RLE: 1: Slight: < 1 cm in maximal amplitude.      LLE: 2: Mild: > 1 cm but < 3 cm in maximal amplitude.    Constancy of rest tremor:  3: Moderate - Tremor at rest is present 51-75% of the entire examination period.       Were dyskinesias present during examination? none   If yes, did these movements interfere with your ratings? no       Abby and Yahr Stage:  2: Bilateral involvement without impairment of balance        TOTAL 23       Last levodopa dose: 7:50AM (Rytary 245mg, 3 capsules)  Time of exam: 9:10AM  Patient's  clinical state is: ON    Speech:  0: Normal, no speech problems   Facial expression:  1: Slight - minimal masked facies manifested only by decreased frequency of blinking.       Rigidity  Neck: 0: Normal - no rigidity    RUE: 0: Normal - no rigidity    LUE: 0: Normal - no rigidity    RLE: 0: Normal - no rigidity    LLE: 1: Slight - rigidity only detected with the activation maneuver        Finger tapping  Right: 0: Normal - no problems.    Left: 0: Normal - no problems.   Hand movements Right: 0: Normal - no problems     Left: 0: Normal - no problems   Pronation-supination movements of hands  Right: 0: Normal - no problems     Left: 0: Normal - no problems   Toe tapping  Right: 0: Normal - no problems    Left: 0: Normal - no problems   Leg agility Right: 0: Normal - no problems.     Left: 0: Normal - no problems.        Arising from chair: 0: Normal - no problems. Able to arise quickly without hesitation.   Gait:  0: Normal - no problems.   Freezing of gait:  0: Normal - no freezing.   Postural stability:  0: Normal - No problems. Recovers with one or two steps.   Posture: 0: Normal - No problems.    Body bradykinesia:  0: Normal - No problems.        Postural tremor of the hands Right: 0: Normal - No tremor.     Left: 0: Normal - No tremor.        Kinetic tremor of the hands  Right: 0: Normal - No tremor.     Left: 1: Slight - Tremor is present but less than 1 cm in amplitude.       Rest tremor amplitude  Lip/jaw: 0: Normal - No tremor.     RUE: 0: Normal - No tremor.      LUE: 0: Normal - No tremor.      RLE: 0: Normal - No tremor.       LLE: 0: Normal - No tremor.     Constancy of rest tremor:  0: Normal - No tremor.        Were dyskinesias present during examination? Yes, right leg   If yes, did these movements interfere with your ratings? No       Abby and Yahr Stage:  1: Unilateral involvement only       TOTAL 4           PROCEDURE     N/A

## 2025-04-01 ENCOUNTER — APPOINTMENT (OUTPATIENT)
Dept: NEUROLOGY | Facility: MEDICAL CENTER | Age: 65
End: 2025-04-01
Attending: INTERNAL MEDICINE
Payer: COMMERCIAL

## 2025-05-15 ENCOUNTER — OFFICE VISIT (OUTPATIENT)
Dept: BEHAVIORAL HEALTH | Facility: CLINIC | Age: 65
End: 2025-05-15
Payer: COMMERCIAL

## 2025-05-15 DIAGNOSIS — R41.89 OTHER SYMPTOMS AND SIGNS INVOLVING COGNITIVE FUNCTIONS AND AWARENESS: Primary | ICD-10-CM

## 2025-05-15 PROCEDURE — 96116 NUBHVL XM PHYS/QHP 1ST HR: CPT | Performed by: COUNSELOR

## 2025-05-15 PROCEDURE — 96139 PSYCL/NRPSYC TST TECH EA: CPT | Performed by: COUNSELOR

## 2025-05-15 PROCEDURE — 96138 PSYCL/NRPSYC TECH 1ST: CPT | Performed by: COUNSELOR

## 2025-05-15 NOTE — PROGRESS NOTES
"NEUROPSYCHOLOGICAL EVALUATION  ** CONFIDENTIAL **     Name: Pantera Parker    Education: GED   : 1960    Occupation: Retired air-traffic controller   MOTLEY: 5/15/2025    Handedness: Ambidextrous   MRN:        [id number] Referral: Aurelio Forman DO      IIdentifying Information and Reason for Referral:   Mr. Pantera Parker is a 65 y.o., ambidextrous, , male referred for neuropsychological evaluation by Aurelio Forman DO to assess cognitive functioning and assist with diagnostic impressions and treatment recommendations, as the patient is being evaluated for DBS. His past medical history is significant for dyslipidemia and Parkinson's disease (PD). Please refer to medical records for additional background information. Mr. Parker and his wife (present for the clinical interview) were informed of the nature and purpose of the evaluation and limits of confidentiality. Please see the table at the end of this report for CPT codes billed.       RELEVANT BACKGROUND  The following information was obtained from a clinical interview and review of select medical records.      Previous Cognitive Screening/Neuropsychological Testing:   None available for review.     Recent Diagnostic Work-up:   Brain MRI W/O Cont. (2025): Impression: 1.  Mild ventriculomegaly of the lateral ventricles without temporal horn dilatation. Mild disproportionate prominence of the Sylvian fissures and a paucity of sulcal markings over the high convexities and vertex. (Question \"high convexity tightness sign\"). These features would raise the possibility of early normal pressure hydrocephalus. 2.  Mild supratentorial white matter disease most consistent with microvascular ischemic change. 3.  No evidence of acute infarction, hemorrhage, or mass lesion.    Recent neurology visit (2025) documented in PD and levodopa induced dyskinesias, with PD symptom onset in 2017 (first symptom right hand tremor). He was noted to be " L-dopa responsive. Exam was notable for mild left arm postural and intention tremor and right leg dyskinesia. UPDRS scores: on =  4; off = 23. REM sleep behavior disorder (occasional), constipation, vitamin D deficiency, and anxiety were noted.    Presenting Concerns: The patient is seeking DBS surgery for PD symptom reduction and to reduce his levodopa medication.  He currently reports taking 9 capsules/day.  He initially had a positive levodopa response but efficacy has reduced and symptoms are worse in the evening.  Tremors are considered the most functionally interfering.  He reported the DBS procedure consists of drilling 2 holes in the head with a lower lead attached to the stimulator in his chest (he is planning to discuss more about leads with his physician). He reported that the leads are placed first, then the simulator, and the procedure can be done either awake or asleep. He reported that there is another tune up with the leads a couple or weeks later.     Reported benefits of the surgery include reduction of tremor, sleep improvement, improved walking, and taking less medication. Reported risks include weight gain, brain bleed, infection, poor placement of the leads, and possibly the need for second surgery. He is hoping to increase manual dexterity in order to more effectively computer program and use his phone. He listed ample social support postsurgery including his wife, 3 children, and friends and family close by. He has minimal reservations about the surgery given that the surgery is relatively safe and has been done effectively for 30-40 years.    Cognitive Complaints: He denied cognitive concerns including memory, attention/processing speed, language, executive functioning, or visual-spatial abilities.  His wife largely corroborated his report though has noticed some mild but isolated recent memory difficulties.      Activities of Daily Living (ADLs): The patient lives with his wife and child.  Basic ADLs (feeding, toileting, dressing, grooming, bathing/hygiene) are intact. He is fully independent in instrumental ADLs including medication management, using technology, finances, appointments, driving, cooking, shopping, and chores.         Current Psychological Functioning/Psychiatric History/Substance Use:   Current Mood: “Fine.” He denied feeling depressed or anxious. He largely denied personality changes aside from mild social withdrawal.      Sleep: He endorsed significant sleep initiation and maintenance difficulties and averages 6 hours per night.  Daytime fatigue is variable.  He continues to report infrequent episodes of REM sleep behavior.  He denied symptoms of sleep apnea. His wife has observed that he has become very focused on his sleep.     Psychiatric History: Per chart, history of anxiety. The patient denied being formally diagnosed with or receiving treatment for a mental health problem (medication, therapy, hospitalizations). He denied a history of suicidal ideation, homicidal ideation, delusions, or hallucinations.    Substance Use: The patient denied a history of alcohol use problems, illicit drug use, or prescription abuse/misuse. He drinks alcohol 1x/month.    Physical Functioning:   Sensory: Decreased smell but otherwise denied sensory changes.    Motor: Current reported PD symptoms include bilateral upper and lower extremity tremor (worse later in the day), mild bradykinesia, rigidity (resolved), and generalized weakness.  He denied balance difficulties or shuffling gait.  He denied history of falls.  Pain: Denied.   Other: He denied symptoms of orthostatic hypotension, urinary incontinence, appetite changes, dysphagia, drooling, sexual dysfunction, or cardiac issues.  He endorsed constipation and periodic excessive sweating.  Despite no appetite changes, he has lost 20 pounds in the last 2 years.       Additional Medical History:   Developmental History: He was born one month  "premature. The patient met developmental milestones (i.e., crawling, walking, talking, toileting) on time and denied complications with his mother's birth.      Other Neurological History: The patient denied a history of traumatic brain injury, stroke, vascular malformation, seizure, brain tumor, or brain infection. He reported TCE pesticide exposure in the 1970s.    MEDICAL HISTORY (Per Medical Record)     Medical History (Per Medical Record): Past Medical History[1]     Problem List (Per Medical Record): Problem List[2]       Medications (Per Medical Record): Encounter Medications with Dispense Information[3]     Family Medical/Psychiatric History:   Family History   Problem Relation Age of Onset    GI Disease Mother     Cancer Father            PSYCHOSOCIAL HISTORY    Origin: The patient was born and raised in [place].  Educational: The patient reported his highest level of formal education as 11 years. He left high school at age 18 because it was \"boring\", obtain his GED, and then completed 1 year of community college. He reported that he typically earned A's and B's in school and never had any learning problems or repeated a grade in school. He was never diagnosed with ADHD.  Occupational History: He worked as an air-traffic controller and retired in 2015.  He currently volunteers as a .  Marital Status:  for 30 years.  Children: 3.  Social Support: Primary support system is comprised of his wife, children, and several family and friends who live in the [facility].     BEHAVIORAL OBSERVATIONS    General appearance: He presented on time and accompanied by his wife. He appeared his stated age, was casually dressed, and appropriately groomed.  Alertness and attention: Alert, engaged, and cooperative.  Orientation: Fully oriented to time, place, and purpose of evaluation.  Demeanor: Interpersonally, the patient was friendly and pleasant.  Eye contact: Within normal limits.  Gait and " Balance: Unremarkable, ambulated independently.   Posture: Unremarkable.   Psychomotor: LUE and LLE resting tremor.  Insight: Intact.  Speech: Clear and fluent with normal rate, rhythm, volume, and prosody. No word-finding problems or paraphasic errors observed.   Language comprehension: Intact.  Sensory: He wore corrective eyeglasses. No gross difficulties with seeing test stimuli. No gross difficulties hearing conversations or hearing test instructions/stimuli.   Thought process: Logical and consistent.  Historian: Good recall of recent and remote events.  Response style: Within normal limits.  Psychotic symptoms: None.  Mood/Affect: Generally euthymic with broad affect.  Validity: Emotional factors did not appear to interfere with his performance on any tests. He displayed adequate cooperation and motivation throughout the testing session. He appeared to give good effort throughout testing and this was supported by normal performance on embedded performance validity measures. As such, test scores are considered a valid representation of the patient's current neuropsychological functioning.     Tests Administered (administered by Nyla Jasmine psychometrist)  [place] Naming Test - Second Edition (BNT-II)  Brief Visuospatial Memory Test-Revised (BVMT-R)  Controlled Oral Word Association Test (COWAT; FAS)  Category Fluency (Animals)  Executive Clock Drawing Test (CLOX)  [place] Verbal Learning Test - Revised (HVLT-R)  Parkinson's Disease Questionnaire - 39 (PDQ-39)  Repeatable Battery for the Assessment of Neuropsychological Status (RBANS)  Stroop Color and Word Test (Stroop)  Test of Premorbid Functioning (ToPF)  Trail Making Test (TMT)  Wechsler Adult Intelligence Test-Fourth Edition (WAIS-IV)-select subtests    Test Results  Please see the attached data table for a summary of all test scores. When possible, the results have been compared to age- and education-based normative comparisons. In accordance with 2020  AACN guidelines, the recommended test score labels (classifications of performances) correspond to the following percentiles: exceptionally low (<2nd percentile); below average (2nd-8th percentile); low average (9th-24th percentile); average (25th-74th percentile); high average (75th-90th percentile); above average (91st-97th percentile); exceptionally high (>98th percentile). Please refer to the Summary and Impressions section of this report for an informed integration and interpretation of the data.    Candidacy for DBS: Appropriateness for DBS surgery from a neuropsychological standpoint is dependent upon a number of factors, including cognitive functioning, emotional functioning, support system, as well as decision-making regarding DBS itself. DBS patients being considered for surgery can be ranked into one of four categories that reflect the appropriateness of DBS surgery from a neuropsychological perspective. Candidates are ranked as “Very Good,” “Good,” “Adequate,” or “Poor.”      “Very good” candidates have clean neuropsychological and emotional profiles, with no noted cognitive deficits or declines and have good family support.      “Good” candidates have mild declines or mild emotional issues that are not believed to be related to incipient dementia and have good family support.      “Adequate” candidates have cognitive profiles in which there are areas of cognitive decline or deficit that may reach the level of mild cognitive impairment or cognitive impairment no dementia (CIND).     “Poor” candidates demonstrate a cognitive pattern consistent with dementia or very probable incipient dementia and are at more risk for cognitive decline following DBS surgery. Candidates may also be deemed poor candidates for emotional/mental health reasons.     SUMMARY AND IMPRESSIONS    Pantera Parker is a 65 y.o., ambidextrous man who was referred for a neuropsychological evaluation by Aurelio Forman DO to assess  "cognitive functioning and assist with diagnostic impressions and treatment recommendations, as this patient is being considered for DBS.    Results of testing, along with the patient's educational and occupational histories, suggest that his estimated long-standing intellectual functioning was in the high average range. Cognitively, the patient's global cognition was well within the acceptable range for consideration for DBS (RBANS Total Scale percentile = 95).  He demonstrated intact to strong performances across all domains including attention/working memory, processing speed, language, visual-spatial abilities, learning/memory, and executive functioning. Psychologically, there is insufficient evidence of any psychiatric disorder that might interfere with DBS as he reported minimal symptoms of depression or anxiety at this time (GDS = 1, GAI = 0), as well as good social support from family to assist with his care during surgery and recovery. He did report significant sleep disturbances but this would not preclude DBS candidacy.  On a PD quality of life questionnaire, he appears to be functionally very well emotionally, socially, cognitively, and in daily life, with mobility as the primary area impacted with PD.    Taken together, he is considered a \"Very Good” surgery candidate from a neuropsychological standpoint, with no noted cognitive deficits or psychiatric contraindications, and has good family support.  Furthermore, the patient expressed a good basic understanding of the DBS device, the surgical procedure, risks/benefits, reasonable expectation about surgical outcomes, and pre and post-surgical requirements.     Diagnoses:  Other signs and symptoms involving cognitive functions and awareness    RECOMMENDATIONS     They have been offered a feedback session to review the findings of this report. A copy of the patient's report has been made available in [place].   He is encouraged to discuss the results of his " recent brain MRI with his neurologist and/or neurosurgeon.   Follow-up neuropsychological testing is recommended to identify areas of stability, potential improvement, or possible decline. This should take place no sooner than 12 months following the procedure.   He is highly encouraged to consult with his physicians about his sleep problems, discuss alternative treatments, and consider a referral to sleep medicine. See below for strategies to help support sleep.  From a cognitive perspective, he is expected to be capable of continuing to manage daily tasks independently.  Strategies to support sleep:  Develop a consistent sleep schedule and reinforce a consistent sleep-wake cycle by going to bed at the same time every night and waking at a consistent time each day. If lying in bed unable to fall asleep within approximately 20 to 30 minutes, get out of bed and leave the bedroom until ready to fall asleep (if safe to do so as deemed appropriate by his physician). Safe sleeping practices include keeping a telephone by the bed, making sure a light is within reach, and reducing hazards in the bedroom.   Avoid napping: Daytime napping may disrupt sleep or make it harder to fall asleep at night. [place] naps (less than 30 minutes) about seven to nine hours after waking are less disruptive to nighttime sleep.    Create a bedtime ritual consisting of winding down for the night engaging in relaxing and non-simulating activities.    Avoid caffeine after noon: Caffeine is a stimulant that can lead to sleep disruption including increased arousal and difficulty falling and staying asleep.   Avoid exercise within two hours of bedtime: Exercising too close to bedtime may negatively impact your ability to become in a relaxed state. Regular exercise earlier in the day, however, is healthy and may help sleep.   Avoid heavy meals within two hours of bedtime: Heavy meals near bedtime strain your digestive system while trying to sleep  and may produce physical discomfort or metabolic changes that interfere with sleep.  7. Strategies to?promote?brain health:???????   Diet: Poor diet can increase risk for cognitive decline in several ways, including increasing inflammation in the body (which is a risk factor for cognitive decline), and increasing risk for vascular/heart disease (which is a risk factor for vascular disease in the brain). Poor diet can increase the risk for other medical conditions that are?known risk factors for cognitive changes (e.g.,?diabetes).?The patient?may consider discussing his diet with his providers, and if necessary, seeing a nutritionist or dietician.???????   Exercise:?Like?diet, exercise can help reduce the risk for medical?problems that increase the risk for cognitive decline. Exercise can also independently improve mood. Research has demonstrated that exercise promotes resistance against neurological and cognitive disorders. He is encouraged to continue to work closely with his physician team to identify a safe set of activities.?????   Cognitive Stimulation: This may take many forms, including cognitively difficult activities, socializing, and other enjoyable, meaningful, or productive daily activities.??      Thank you for allowing me to participate in Pantera Parker's care.  If you have any questions about the information contained in this report, please do not hesitate to contact me.    Mohinder Gonzalez Psy.D.  Clinical Neuropsychologist  [facility]   Licensed Psychologist [id number] #[id number]    Premorbid Intellectual Functioning Raw SS Demo T Predicted with Sim. Demo. SS Base rate %   TOPF Reading Performance 51 108   98 25.6   Global Cognition Raw ss / SS / z   GA   RBANS-Update Total Scale   125   95   Attention & Working Memory  Raw ss Demo T GA   WAIS-IV Digit Span  35 14 68 96           Forward (DSF) 12 12   75           Backward (DSB) 11 13   84           Sequencing (DSS) 12 16   98   Attention &  Processing Speed Raw ss / z Demo T MI   TMT - Part A (errors) 18 (0)   72 99   Stroop - Word (Bossman, et. al.,  2022) 84   45 31   Stroop - Color (Bossman, et. al.,  2022) 69   54 66   Language Raw ss Demo T MI   BNT-II 57   58 79   RBANS-Update Attention   128   97        Digit Span 12 12   75        Coding 63 17   99   RBANS-Update Language   98   45        Picture Naming 9     17-25        Semantic Fluency 20 9   37   Visuospatial/Constructional Raw ss / z Demo T MI   CLOX II 14 -0.17   43   WAIS-IV Perceptual Reasoning Index 48 135   99      Block Design  53 14 65 93   Learning and Memory           HVLT-R Raw ss Demo T MI        Trial 1 9   62 88        Trial 2 10   53 62        Trial 3 11   54 66        Total Recall (1-3) 30   58 79        Delayed Recall 10   53 62        Percent Retained 91   50 50        Recognition Discrimination 12   59 82        Hits 12              False Positives 0         BVMT-R Raw ss T MI        Trial 1 7   59 82        Trial 2 9   56 73        Trial 3 10   55 69        Total Recall (1-3) 26   57 76        Learning 3   46 34        Delayed Recall 10   57 76        Percent Retained 100     >16        Recognition Hits 6     >16        Recognition FP 0     >16        Recognition Discrimination 6     >16   RBANS-Update Visuo/Construct   116   86        Figure Copy 19 11   63        Line Orientation 20     >75   Memory           RBANS-Update Imm Mem   123   94        List Learning 37 17   99        Story Memory 18 11   63   RBANS-Update Delayed Memory   121   92        List Recall 10     >75        List Recognition  20     51-75        Story Recall 9 10   50        Figure Recall 18 14   91   Executive Functions  Raw ss / z Demo T MI   WAIS-IV Similarities 30 13 63 90   WAIS-IV Matrix Reasoning 25 18 80 100   CLOX I 14 0.5   69   TMT - Part B (errors) 44 (0)   72 99   COWA - FAS 50   63 90   COWA - Animals 24   63 90   Stroop Color-Word (Bossman, et. al.,  2022) 46   64 92   Stroop  Interference (Bossman et. al.,  2022) 9   59 82   Mood Raw Descriptor   GDS 1 normal   GAI 0 normal   PDQ Raw AR      Mobility  20 50      ADL 0 0      Emotional Well-Being 0 0      Stigma 0 0      Social Support 0 0      Cognitions 0 0 a      Communications 0 0      Bodily Discomfort 0 0       Neuropsychological Services Provided Code Total Units Date/Time   Clinical Interview/Neurobehavioral Status Exam          Hour 1 98358 1  5/15 [phone]   Neuropsychological Test Administration and Scoring by Technician         First 30-minutes 00939 1  5/15 [phone]       Additional 30-minutes 49972 5  5/15 [phone]; [id number]          [1]   Past Medical History:  Diagnosis Date    CAD (coronary artery disease)     High cholesterol    [2]   Patient Active Problem List  Diagnosis    Dyslipidemia    Diverticulosis of large intestine    Coronary artery disease due to lipid rich plaque    Parkinson's disease (HCC)    Status post insertion of drug eluting coronary artery stent    Bruit of left carotid artery   [3]   Current Outpatient Medications   Medication Sig Refill Last Dispense    aspirin 81 MG EC tablet Take 1 Tab by mouth every day. 11 Unknown (outside pharmacy)    Carbidopa-Levodopa ER (RYTARY) 61. MG Cap CR Take 2 Capsules by mouth 4 times a day. 3 Unknown (outside pharmacy)    clonazePAM (KLONOPIN) 0.5 MG Tab Take 1 Tablet by mouth every evening for 180 days. 1 Unknown (outside pharmacy)    docusate sodium (COLACE) 100 MG Cap Take 100 mg by mouth 2 times a day.  Unknown (patient-reported)    nitroglycerin (NITROSTAT) 0.4 MG SL Tab Place 1 Tab under tongue as needed for Chest Pain. 0 Unknown (outside pharmacy)    rasagiline (AZILECT) 1 MG Tab Take 1 Tablet by mouth every day. 3 Unknown (outside pharmacy)    rosuvastatin (CRESTOR) 10 MG Tab Take 10 mg by mouth every evening.  Unknown (patient-reported)

## 2025-05-22 ENCOUNTER — HOSPITAL ENCOUNTER (OUTPATIENT)
Dept: RADIOLOGY | Facility: MEDICAL CENTER | Age: 65
End: 2025-05-22
Attending: STUDENT IN AN ORGANIZED HEALTH CARE EDUCATION/TRAINING PROGRAM
Payer: COMMERCIAL

## 2025-05-22 DIAGNOSIS — G20.A1 HYPOKINETIC PARKINSONIAN DYSPHONIA (HCC): ICD-10-CM

## 2025-05-22 DIAGNOSIS — R49.0 HYPOKINETIC PARKINSONIAN DYSPHONIA (HCC): ICD-10-CM

## 2025-05-22 PROCEDURE — 70551 MRI BRAIN STEM W/O DYE: CPT

## 2025-06-05 ENCOUNTER — TELEPHONE (OUTPATIENT)
Dept: NEUROLOGY | Facility: MEDICAL CENTER | Age: 65
End: 2025-06-05
Payer: COMMERCIAL

## 2025-06-06 ENCOUNTER — OFFICE VISIT (OUTPATIENT)
Dept: NEUROLOGY | Facility: MEDICAL CENTER | Age: 65
End: 2025-06-06
Attending: INTERNAL MEDICINE
Payer: COMMERCIAL

## 2025-06-06 VITALS
OXYGEN SATURATION: 96 % | BODY MASS INDEX: 19.32 KG/M2 | SYSTOLIC BLOOD PRESSURE: 102 MMHG | TEMPERATURE: 97.4 F | WEIGHT: 134.92 LBS | DIASTOLIC BLOOD PRESSURE: 62 MMHG | RESPIRATION RATE: 12 BRPM | HEIGHT: 70 IN | HEART RATE: 74 BPM

## 2025-06-06 DIAGNOSIS — K59.01 CONSTIPATION DUE TO SLOW TRANSIT: ICD-10-CM

## 2025-06-06 DIAGNOSIS — G20.B2 PARKINSON'S DISEASE WITH DYSKINESIA AND FLUCTUATING MANIFESTATIONS (HCC): Primary | ICD-10-CM

## 2025-06-06 PROCEDURE — 99213 OFFICE O/P EST LOW 20 MIN: CPT | Performed by: INTERNAL MEDICINE

## 2025-06-06 PROCEDURE — 3074F SYST BP LT 130 MM HG: CPT | Performed by: INTERNAL MEDICINE

## 2025-06-06 PROCEDURE — 99215 OFFICE O/P EST HI 40 MIN: CPT | Performed by: INTERNAL MEDICINE

## 2025-06-06 PROCEDURE — 3078F DIAST BP <80 MM HG: CPT | Performed by: INTERNAL MEDICINE

## 2025-06-06 ASSESSMENT — PATIENT HEALTH QUESTIONNAIRE - PHQ9: CLINICAL INTERPRETATION OF PHQ2 SCORE: 0

## 2025-06-06 NOTE — PROGRESS NOTES
Aurelio Forman,   Neurology, Movement Disorders [facility]  [address] [place], [place] [address] [facility], [address]. [place] [address]  Phone: [phone], Fax: [phone]     ASSESSMENT / PLAN   Pantera Parker is a 64 y.o. LHD male presenting for parkinson's disease    Parkinson's disease  Levodopa induced dyskinesias  Symptom onset 2017 with right hand tremor.  Has been levodopa responsive.  Exam notable for mild left arm postural and intention tremor.  Right leg dyskinesia.    He would like to hold off on amantadine and/or Gocovri as dyskinesias are not too bothersome.  [facility] discussed  ON/OFF testing 01/29/25: UPDRS Part 3 score Off = 23. On = 4.    - [facility] planned first stage of DBS surgery 6/26/2025, second stage 7/3/25, initial programming 7/17/25  - Plan for second DBS programming 8/2025 with me  - Rytary 245 mg, 3 capsules, 3 times a day  - Rasagiline 1 mg daily: stop 2 weeks before surgery    REM sleep behavior disorder  Sleep maintenance insomnia  Rare episodes of RBD, but benefiting from clonazepam for insomnia  - clonazepam 0.5mg nightly, consider 0.5 - 1 tab  - can try melatonin 5 - 20mg nightly as needed. Take 1 hour before bedtime, at a consistent time each night. Look for label with USP certification.   - try Rytary 1-2 capsules at bedtime in case OFF-symptoms are triggering nighttime awakening    Constipation  - Colace 2x/day. Try adding miralax, half-dose as needed    Vit D deficiency  - continue supplementation    Anxiety  - stable      No orders of the defined types were placed in this encounter.    Return in about 2 months (around 8/19/2025).    BILLING DOCUMENTATION:   Excluding time spent on procedures during visit, I spent 40 minutes reviewing the medical record, interviewing and examining the patient, discussing diagnosis and treatment, and coordinating care.              HISTORY OF PRESENT ILLNESS   Pantera Parker is a 64 y.o. LHD male presenting for parkinson's  "disease    Initial HPI 12/30/24  Previously seen Dr. Ramachandran, [facility] with Dr. Chavez, Dr. Batista, Dr. Ruffin, Dr. Villarreal (notes scanned in media). Last visit 8/2024.    Onset: 2017 with tremor in the right hand.    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 Vit D: 24 (LOW)    Previous medication:  - Propranolol: No benefit  - Rasagiline 1 mg: started in 2017  - Rytary 145mg: started in 2020      Interval history  12/30/24: first visit with me. No med changes. Referral for DBS at [facility].  01/29/25: trial Rytary at bedtime  04/16/25: met with Dr. Kylie Mack at [facility] for DBS evaluation  06/06/25: no med changes. Pending DBS surgery 6/26      Current Regimen  Rytary 245mg: 3 capsules, 3 times a day (approx 6AM, 11AM, 5PM)  Rasagiline 1 mg    Latency:  <45min    Duration:   - no wearing off. Lasts approx 4-6 hours.   - 01/29/25: 6AM dose sometimes wears off before 6AM  - 06/06/25: uncertain if fatigue vs wearing off approx 4:30PM/5PM     Efficacy:   - tremors better. Some left leg tremors. 3 capsules more effective.   - 01/29/25: after stopping rasagline on 1/14/25, he noted that in 10 days needed to increase Rytary to 3 capsules.     Dyskinesia/Dystonia:   - left arm dyskinesias, since resolved after Rytary adjusted. No dyskinesias with 3 capsules    Sfx: none      ROS  Gait:  Some impairment. Sometimes catches foot. No falls  PT: none  Exercise: recumbent bicycle    Orthostasis:   No issues    GI:   +constipation: colase BID helps.   Miralax too soft, \"soft serve\". Colase more firm and well formed. BM every day.    :   No issues    Speech/Swallow:   No dysphagia aside from peanuts  +hypophonia: more raspy. More thick saliva    Cognition:   No issues    Mood:   +anxiety: affects his sleep in past. Had to stop video games since it affected his sleep.   No depression  06/06/25 PHQ-9 = 0, " C-SSRS = neg    Hallucinations:   No issues    Sleep/RBD:   +RBD: a few times a year. Yelling. Got up out of bed. Clonazepam unclear if reduced frequency. Stable.  +Insomnia: falls asleep OK, but wakes up and can't fall back asleep. Wakes up 1-2 times a night. Clonazepam 0.5mg helped. Feels refreshed in AM. Fluctuates, kaylen with nocturia    Autonomics:  Night sweats, since resolved. Only 1 episode recently.        Past Medical History:   Diagnosis Date    CAD (coronary artery disease)     High cholesterol      Past Surgical History:   Procedure Laterality Date    ZZZ CARDIAC CATH  11/30/2018    Stent to 90% LAD,has 90% OM1 that needs intervention.    HERNIA REPAIR Bilateral     age 24 months    VASECTOMY Bilateral     1997     Family History   Problem Relation Age of Onset    GI Disease Mother     Cancer Father      Social History     Socioeconomic History    Marital status:      Spouse name: Not on file    Number of children: Not on file    Years of education: Not on file    Highest education level: Not on file   Occupational History    Not on file   Tobacco Use    Smoking status: Never    Smokeless tobacco: Never   Vaping Use    Vaping status: Never Used   Substance and Sexual Activity    Alcohol use: Yes     Alcohol/week: 0.6 oz     Types: 1 Shots of liquor per week     Comment: rare    Drug use: No    Sexual activity: Yes     Partners: Female     Birth control/protection: Surgical   Other Topics Concern    Not on file   Social History Narrative    Not on file     Social Drivers of Health     Financial Resource Strain: Not on file   Food Insecurity: Not on file   Transportation Needs: Not on file   Physical Activity: Not on file   Stress: Not on file   Social Connections: Not on file   Intimate Partner Violence: Not on file   Housing Stability: Not on file     Current Outpatient Medications   Medication    rosuvastatin (CRESTOR) 10 MG Tab    docusate sodium (COLACE) 100 MG Cap    Carbidopa-Levodopa ER  "(RYTARY) 61. MG Cap CR    rasagiline (AZILECT) 1 MG Tab    clonazePAM (KLONOPIN) 0.5 MG Tab    aspirin 81 MG EC tablet    nitroglycerin (NITROSTAT) 0.4 MG SL Tab     No current facility-administered medications for this visit.     Allergies   Allergen Reactions    Lipitor [Atorvastatin]      Arm stiffness             DATA / RESULTS     NPT, pre-DBS 5/15/25. Mohinder Gonzalez PsyD  considered a \"Very Good” surgery candidate from a neuropsychological standpoint, with no noted cognitive deficits or psychiatric contraindications, and has good family support.     25-Hydroxy   Vitamin D 25   Date Value Ref Range Status   05/12/2016 17.5 (L) 30.0 - 100.0 ng/mL Final     Comment:     Vitamin D deficiency has been defined by the [place] of  [employer] and an Endocrine Society practice guideline as a  level of serum 25-OH vitamin D less than 20 ng/mL (1,2).  The Endocrine Society went on to further define vitamin D  insufficiency as a level between 21 and 29 ng/mL (2).  1. IOM ([place] [employer]). 2010. Dietary reference  intakes for calcium and D. [place]: The  National Academies Press.  2. Itz MF, Alisha NC, Audra VALADEZ, et al.  Evaluation, treatment, and prevention of vitamin D  deficiency: an Endocrine Society clinical practice  guideline. JCEM. 2011 Jul; 96(7):1911-30.  A courtesy copy of this report has been sent to  the patient.       TSH   Date Value Ref Range Status   11/30/2018 1.220 0.380 - 5.330 uIU/mL Final     Comment:     Please note new reference ranges effective 12/14/2017 10:00 AM  Pregnant Females, 1st Trimester  0.050-3.700  Pregnant Females, 2nd Trimester  0.310-4.350  Pregnant Females, 3rd Trimester  0.410-5.180       Glycohemoglobin   Date Value Ref Range Status   01/21/2020 5.7 (A) 0.0 - 5.6 % Final     LDL   Date Value Ref Range Status   01/21/2020 46 0 - 99 mg/dL Final                OBJECTIVE      Vitals:    06/06/25 0745   BP: 102/62   BP Location: Left arm   Patient " "Position: Sitting   BP Cuff Size: Small adult   Pulse: 74   Resp: 12   Temp: 36.3 °C (97.4 °F)   TempSrc: Temporal   SpO2: 96%   Weight: 61.2 kg (134 lb 14.7 oz)   Height: 1.778 m (5' 10\")       Rytary last taken 6AM, 2 hours ago. Feels ON    UPDRS Right Left   Finger tapping 0 0   Hand Movement 0 0   Toe Tapping 0 0   Leg Agility 0 0   Rigidity 0 0   Rest Tremor 0 0   Postural Tremor 0 1   Kinetic Tremor 1 1      +right leg dyskinesias, intermittent    Gait:   Posture - normal   Base - narrow   Stride length - normal   Arm swing - symmetric (better)  Speed - normal  Shuffling/freezing - none  U-Turn - normal         PROCEDURE     N/A  "

## 2025-06-06 NOTE — PATIENT INSTRUCTIONS
- clonazepam 0.5mg nightly, consider 0.5 - 1 tab  - can try melatonin 5 - 20mg nightly as needed. Take 1 hour before bedtime, at a consistent time each night. Look for label with USP certification.   - try Rytary 1-2 capsules at bedtime in case OFF-symptoms are triggering nighttime awakening    - Rasagiline 1 mg daily: stop 2 weeks before surgery    - Colace 1-2x/day. Try adding miralax, half-dose as needed

## 2025-07-16 ENCOUNTER — APPOINTMENT (OUTPATIENT)
Dept: URBAN - METROPOLITAN AREA CLINIC 15 | Facility: CLINIC | Age: 65
Setting detail: DERMATOLOGY
End: 2025-07-16

## 2025-07-16 DIAGNOSIS — Z71.89 OTHER SPECIFIED COUNSELING: ICD-10-CM

## 2025-07-16 DIAGNOSIS — L82.0 INFLAMED SEBORRHEIC KERATOSIS: ICD-10-CM

## 2025-07-16 DIAGNOSIS — L82.1 OTHER SEBORRHEIC KERATOSIS: ICD-10-CM

## 2025-07-16 DIAGNOSIS — L57.8 OTHER SKIN CHANGES DUE TO CHRONIC EXPOSURE TO NONIONIZING RADIATION: ICD-10-CM | Status: INADEQUATELY CONTROLLED

## 2025-07-16 PROBLEM — D48.5 NEOPLASM OF UNCERTAIN BEHAVIOR OF SKIN: Status: ACTIVE | Noted: 2025-07-16

## 2025-07-16 PROCEDURE — ? COUNSELING

## 2025-07-16 PROCEDURE — ? BIOPSY BY SHAVE METHOD

## 2025-07-16 ASSESSMENT — LOCATION SIMPLE DESCRIPTION DERM
LOCATION SIMPLE: LEFT HAND
LOCATION SIMPLE: LEFT ANTERIOR NECK

## 2025-07-16 ASSESSMENT — LOCATION DETAILED DESCRIPTION DERM
LOCATION DETAILED: LEFT CLAVICULAR NECK
LOCATION DETAILED: LEFT RADIAL DORSAL HAND

## 2025-07-16 ASSESSMENT — LOCATION ZONE DERM
LOCATION ZONE: NECK
LOCATION ZONE: HAND

## 2025-07-16 NOTE — PROCEDURE: BIOPSY BY SHAVE METHOD
Body Location Override (Optional - Billing Will Still Be Based On Selected Body Map Location If Applicable): Left Neck
Detail Level: Detailed
Depth Of Biopsy: dermis
Was A Bandage Applied: Yes
Size Of Lesion In Cm: 0.5
Biopsy Type: H and E
Biopsy Method: Dermablade
Anesthesia Type: 1% lidocaine with epinephrine
Additional Anesthesia Volume In Cc (Will Not Render If 0): 0
Hemostasis: Drysol
Wound Care: Petrolatum
Dressing: bandage
Destruction After The Procedure: No
Type Of Destruction Used: Curettage
Curettage Text: The wound bed was treated with curettage after the biopsy was performed.
Cryotherapy Text: The wound bed was treated with cryotherapy after the biopsy was performed.
Electrodesiccation Text: The wound bed was treated with electrodesiccation after the biopsy was performed.
Electrodesiccation And Curettage Text: The wound bed was treated with electrodesiccation and curettage after the biopsy was performed.
Silver Nitrate Text: The wound bed was treated with silver nitrate after the biopsy was performed.
Lab: [id number]
Lab Facility: 
Medical Necessity Information: It is in your best interest to select a reason for this procedure from the list below. All of these items fulfill various CMS LCD requirements except the new and changing color options.
Consent: Written consent was obtained and risks were reviewed including but not limited to scarring, infection, bleeding, scabbing, incomplete removal, nerve damage and allergy to anesthesia.
Post-Care Instructions: I reviewed with the patient in detail post-care instructions. Patient is to keep the biopsy site dry overnight, and then apply bacitracin twice daily until healed. Patient may apply hydrogen peroxide soaks to remove any crusting.
Notification Instructions: Patient will be notified of biopsy results. However, patient instructed to call the office if not contacted within 2 weeks.
Billing Type: Third-Party Bill
Information: Selecting Yes will display possible errors in your note based on the variables you have selected. This validation is only offered as a suggestion for you. PLEASE NOTE THAT THE VALIDATION TEXT WILL BE REMOVED WHEN YOU FINALIZE YOUR NOTE. IF YOU WANT TO FAX A PRELIMINARY NOTE YOU WILL NEED TO TOGGLE THIS TO 'NO' IF YOU DO NOT WANT IT IN YOUR FAXED NOTE.

## 2025-08-14 ENCOUNTER — TELEPHONE (OUTPATIENT)
Dept: NEUROLOGY | Facility: MEDICAL CENTER | Age: 65
End: 2025-08-14
Payer: COMMERCIAL

## 2025-08-15 ENCOUNTER — OFFICE VISIT (OUTPATIENT)
Dept: NEUROLOGY | Facility: MEDICAL CENTER | Age: 65
End: 2025-08-15
Attending: INTERNAL MEDICINE
Payer: COMMERCIAL

## 2025-08-15 VITALS
TEMPERATURE: 98 F | HEIGHT: 70 IN | BODY MASS INDEX: 21.15 KG/M2 | DIASTOLIC BLOOD PRESSURE: 68 MMHG | SYSTOLIC BLOOD PRESSURE: 112 MMHG | WEIGHT: 147.71 LBS | RESPIRATION RATE: 16 BRPM | HEART RATE: 67 BPM | OXYGEN SATURATION: 98 %

## 2025-08-15 DIAGNOSIS — G20.B2 PARKINSON'S DISEASE WITH DYSKINESIA AND FLUCTUATING MANIFESTATIONS (HCC): ICD-10-CM

## 2025-08-15 DIAGNOSIS — Z45.42 ENCOUNTER FOR ADJUSTMENT AND MANAGEMENT OF NEUROSTIMULATOR: Primary | ICD-10-CM

## 2025-08-15 PROCEDURE — 99213 OFFICE O/P EST LOW 20 MIN: CPT

## 2025-08-15 PROCEDURE — 3078F DIAST BP <80 MM HG: CPT | Performed by: INTERNAL MEDICINE

## 2025-08-15 PROCEDURE — 99214 OFFICE O/P EST MOD 30 MIN: CPT | Mod: 25 | Performed by: INTERNAL MEDICINE

## 2025-08-15 PROCEDURE — 95983 ALYS BRN NPGT PRGRMG 15 MIN: CPT | Performed by: INTERNAL MEDICINE

## 2025-08-15 PROCEDURE — 3074F SYST BP LT 130 MM HG: CPT | Performed by: INTERNAL MEDICINE

## 2025-08-15 RX ORDER — POLYETHYLENE GLYCOL 3350 17 G/17G
17 POWDER, FOR SOLUTION ORAL DAILY
COMMUNITY

## 2025-08-15 RX ORDER — CLONAZEPAM 0.5 MG/1
0.5 TABLET ORAL EVERY EVENING
COMMUNITY

## 2025-08-15 RX ORDER — METFORMIN HYDROCHLORIDE 500 MG/1
500 TABLET, EXTENDED RELEASE ORAL DAILY
COMMUNITY
Start: 2025-08-07

## 2025-08-15 RX ORDER — LEVODOPA AND CARBIDOPA 245; 61.25 MG/1; MG/1
1 CAPSULE, EXTENDED RELEASE ORAL 3 TIMES DAILY
Qty: 270 CAPSULE | Refills: 3 | Status: SHIPPED | OUTPATIENT
Start: 2025-08-15

## 2025-08-15 ASSESSMENT — LIFESTYLE VARIABLES
HOW MANY STANDARD DRINKS CONTAINING ALCOHOL DO YOU HAVE ON A TYPICAL DAY: PATIENT DOES NOT DRINK
HOW OFTEN DO YOU HAVE SIX OR MORE DRINKS ON ONE OCCASION: NEVER
AUDIT-C TOTAL SCORE: 0
SKIP TO QUESTIONS 9-10: 1
HOW OFTEN DO YOU HAVE A DRINK CONTAINING ALCOHOL: NEVER

## 2025-08-15 ASSESSMENT — PATIENT HEALTH QUESTIONNAIRE - PHQ9: CLINICAL INTERPRETATION OF PHQ2 SCORE: 0
